# Patient Record
Sex: FEMALE | Race: WHITE | Employment: OTHER | URBAN - METROPOLITAN AREA
[De-identification: names, ages, dates, MRNs, and addresses within clinical notes are randomized per-mention and may not be internally consistent; named-entity substitution may affect disease eponyms.]

---

## 2017-03-02 ENCOUNTER — OFFICE VISIT (OUTPATIENT)
Dept: INTERNAL MEDICINE CLINIC | Age: 67
End: 2017-03-02

## 2017-03-02 VITALS
TEMPERATURE: 97.4 F | DIASTOLIC BLOOD PRESSURE: 71 MMHG | RESPIRATION RATE: 16 BRPM | WEIGHT: 127 LBS | SYSTOLIC BLOOD PRESSURE: 124 MMHG | BODY MASS INDEX: 21.68 KG/M2 | HEART RATE: 84 BPM | OXYGEN SATURATION: 97 % | HEIGHT: 64 IN

## 2017-03-02 DIAGNOSIS — H26.9 CATARACT: Primary | ICD-10-CM

## 2017-03-02 DIAGNOSIS — Z01.818 PRE-OP EXAM: ICD-10-CM

## 2017-03-02 DIAGNOSIS — J30.9 ALLERGIC RHINITIS, UNSPECIFIED ALLERGIC RHINITIS TRIGGER, UNSPECIFIED RHINITIS SEASONALITY: ICD-10-CM

## 2017-03-02 RX ORDER — KETOROLAC TROMETHAMINE 4 MG/ML
SOLUTION/ DROPS OPHTHALMIC
COMMUNITY
Start: 2017-02-27 | End: 2018-03-02 | Stop reason: ALTCHOICE

## 2017-03-02 RX ORDER — PREDNISOLONE ACETATE 10 MG/ML
SUSPENSION/ DROPS OPHTHALMIC
COMMUNITY
Start: 2017-02-27 | End: 2018-03-02 | Stop reason: ALTCHOICE

## 2017-03-02 RX ORDER — OFLOXACIN 3 MG/ML
SOLUTION/ DROPS OPHTHALMIC
COMMUNITY
Start: 2017-02-27 | End: 2018-03-02 | Stop reason: ALTCHOICE

## 2017-03-02 NOTE — PROGRESS NOTES
HISTORY OF PRESENT ILLNESS  Michelle Hand is a 77 y.o. female. This is a patient of Dr. Courtney Vargas who presents today for pre-op exam.  The patient is scheduled to have cataract extraction by Dr. Juwan Bashir. She will have right eye surgery on March 14, 2017 and left eye on March 28,2017. The patient states she is generally feeling well at the time of today's visit. She has noted several weeks of intermittent nasal congestion, left ear congestion, and post-nasal drip. No fevers, chills, chest pain or shortness of breath. Visit Vitals    /71    Pulse 84    Temp 97.4 °F (36.3 °C) (Oral)    Resp 16    Ht 5' 4\" (1.626 m)    Wt 127 lb (57.6 kg)    SpO2 97%    BMI 21.8 kg/m2     HPI    Review of Systems   Constitutional: Negative for chills, fever, malaise/fatigue and weight loss. HENT: Positive for congestion. Negative for ear pain and sore throat. Eyes: Negative for pain. Respiratory: Negative for cough, shortness of breath and wheezing. Cardiovascular: Negative for chest pain, palpitations and leg swelling. Gastrointestinal: Negative for abdominal pain. Genitourinary: Negative. Musculoskeletal: Negative. Skin: Negative. Neurological: Negative for dizziness, tingling, tremors, weakness and headaches. Endo/Heme/Allergies: Negative. Psychiatric/Behavioral: Negative. Physical Exam   Constitutional: She is oriented to person, place, and time. She appears well-developed and well-nourished. No distress. HENT:   Head: Normocephalic and atraumatic. Right Ear: External ear normal.   Left Ear: External ear normal.   Op with cobblestoning   Eyes: Conjunctivae are normal.   Neck: Neck supple. Cardiovascular: Normal rate, regular rhythm, normal heart sounds and intact distal pulses. No murmur heard. Pulmonary/Chest: Effort normal and breath sounds normal. No respiratory distress. She has no wheezes. She has no rales. Abdominal: Soft.  Bowel sounds are normal. She exhibits no distension. There is no tenderness. Musculoskeletal: She exhibits no edema. Lymphadenopathy:     She has no cervical adenopathy. Neurological: She is alert and oriented to person, place, and time. Skin: Skin is warm and dry. Psychiatric: She has a normal mood and affect. Her behavior is normal.   Nursing note and vitals reviewed. ASSESSMENT and PLAN    ICD-10-CM ICD-9-CM    1. Cataract H26.9 366.9 Completed H&P form. Continue to follow with Dr. Jeanna Cain   2. Pre-op exam Z01.818 V72.84 As above   3. Allergic rhinitis, unspecified allergic rhinitis trigger, unspecified rhinitis seasonality J30.9 477.9 May take OTC Claritin as per packaging instructions and Flonase as per packaging instructions. Patient encouraged to call or return to office if symptoms do not improve or worsen. Reviewed medications and side effects in detail    Patient encouraged to call or return to office if symptoms do not improve or worsen. Reviewed plan of care with patient who acknowledges understanding and agrees.

## 2017-03-02 NOTE — MR AVS SNAPSHOT
Visit Information Date & Time Provider Department Dept. Phone Encounter #  
 3/2/2017  2:30 PM Anjelica Everett, [address] [facility] Internal Medicine [phone] [id number] Upcoming Health Maintenance Date Due Hepatitis C Screening 1950 DTaP/Tdap/Td series (1 - Tdap) 3/30/1971 FOBT Q 1 YEAR AGE 50-75 3/30/2000 ZOSTER VACCINE AGE 60> 3/30/2010 GLAUCOMA SCREENING Q2Y 3/30/2015 MEDICARE YEARLY EXAM 3/30/2015 BREAST CANCER SCRN MAMMOGRAM 9/27/2015 Pneumococcal 65+ Low/Medium Risk (2 of 2 - PPSV23) 2/14/2016 Allergies as of 3/2/2017  Review Complete On: 3/2/2017 By: Anjelica Everett NP No Known Allergies Current Immunizations  Reviewed on 11/2/2016 Name Date Influenza High Dose Vaccine PF 11/2/2016 Pneumococcal Vaccine (Unspecified Type) 2/14/2011 Not reviewed this visit You Were Diagnosed With   
  
 Codes Comments Cataract    -  Primary ICD-10-CM: H26.9 ICD-9-CM: 366.9 Pre-op exam     ICD-10-CM: S75.462 ICD-9-CM: V72.84 Vitals BP  
  
  
  
  
  
 124/71 Vitals History BMI and BSA Data Body Mass Index Body Surface Area  
 21.8 kg/m 2 1.61 m 2 Preferred Pharmacy Pharmacy Name Phone [facility] [address] [facility] [phone] Your Updated Medication List  
  
   
This list is accurate as of: 3/2/17  3:16 PM.  Always use your most recent med list.  
  
  
  
  
 Citracal + D tablet Generic drug:  calcium citrate-vitamin D3 Take  by mouth every thirty (30) days. COQ-10 PO Take  by mouth. Flaxseed Oil Oil  
by Does Not Apply route.  
  
 ketorolac 0.4 % Drop Commonly known as:  ACULAR LS  
  
 M-VIT PO Take  by mouth. ofloxacin 0.3 % ophthalmic solution Commonly known as:  FLOXIN  
  
 PARLODEL 2.5 mg tablet Generic drug:  bromocriptine Take 2.5 mg by mouth daily. prednisoLONE acetate 1 % ophthalmic suspension Commonly known as:  PRED FORTE TURMERIC ROOT EXTRACT PO Take  by mouth. Introducing [facility] [place] [facility] SERVICES! Dear Christy Aaron: Thank you for requesting a [employer] account. Our records indicate that you already have an active [employer] account. You can access your account anytime at [email or web address] Did you know that you can access your hospital and ER discharge instructions at any time in [employer]? You can also review all of your test results from your hospital stay or ER visit. Additional Information If you have questions, please visit the Frequently Asked Questions section of the [employer] website at [email or web address] Remember, [employer] is NOT to be used for urgent needs. For medical emergencies, dial 911. Now available from your iPhone and Android! Please provide this summary of care documentation to your next provider. Your primary care clinician is listed as Cony Pedro. If you have any questions after today's visit, please call [phone].

## 2017-03-02 NOTE — PROGRESS NOTES
Chief Complaint   Patient presents with    Pre-op Exam     right eye cataract - Dr. Franny Iraheta     1. Have you been to the ER, urgent care clinic since your last visit? Hospitalized since your last visit? No    2. Have you seen or consulted any other health care providers outside of the [address] [place] [address] since your last visit? Include any pap smears or colon screening.    Ashkan Molina and also Eye Dr. Connie Lazar.      Used 2 patient I. D. 's

## 2017-07-12 ENCOUNTER — HOSPITAL ENCOUNTER (OUTPATIENT)
Dept: MAMMOGRAPHY | Age: 67
Discharge: HOME | End: 2017-07-12
Attending: INTERNAL MEDICINE
Payer: COMMERCIAL

## 2017-07-12 DIAGNOSIS — Z12.31 VISIT FOR SCREENING MAMMOGRAM: ICD-10-CM

## 2017-07-12 PROCEDURE — 77067 SCR MAMMO BI INCL CAD: CPT

## 2017-12-04 ENCOUNTER — OFFICE VISIT (OUTPATIENT)
Dept: INTERNAL MEDICINE CLINIC | Age: 67
End: 2017-12-04

## 2017-12-04 VITALS
HEART RATE: 90 BPM | BODY MASS INDEX: 21.68 KG/M2 | RESPIRATION RATE: 18 BRPM | WEIGHT: 127 LBS | HEIGHT: 64 IN | DIASTOLIC BLOOD PRESSURE: 69 MMHG | OXYGEN SATURATION: 98 % | SYSTOLIC BLOOD PRESSURE: 126 MMHG

## 2017-12-04 DIAGNOSIS — D35.2 PITUITARY ADENOMA (HCC): ICD-10-CM

## 2017-12-04 DIAGNOSIS — M85.80 OSTEOPENIA, UNSPECIFIED LOCATION: ICD-10-CM

## 2017-12-04 DIAGNOSIS — M48.02 CERVICAL SPINAL STENOSIS: ICD-10-CM

## 2017-12-04 DIAGNOSIS — J06.9 VIRAL URI WITH COUGH: Primary | ICD-10-CM

## 2017-12-04 DIAGNOSIS — K57.90 DIVERTICULOSIS OF INTESTINE WITHOUT BLEEDING, UNSPECIFIED INTESTINAL TRACT LOCATION: ICD-10-CM

## 2017-12-04 NOTE — PROGRESS NOTES
Health Maintenance Due   Topic Date Due    Hepatitis C Screening  1950    DTaP/Tdap/Td series (1 - Tdap) 03/30/1971    FOBT Q 1 YEAR AGE 50-75  03/30/2000    ZOSTER VACCINE AGE 60>  01/30/2010    MEDICARE YEARLY EXAM  03/30/2015    Pneumococcal 65+ Low/Medium Risk (2 of 2 - PPSV23) 02/14/2016    Influenza Age 5 to Adult  08/01/2017       Chief Complaint   Patient presents with    Cold Symptoms     mostly at night    Ear Fullness    Osteopenia       1. Have you been to the ER, urgent care clinic since your last visit? Hospitalized since your last visit? No    2. Have you seen or consulted any other health care providers outside of the [address] [place] [address] since your last visit? Include any pap smears or colon screening. No    3) Do you have an Advance Directive on file? no    4) Are you interested in receiving information on Advance Directives? NO      Patient is accompanied by self I have received verbal consent from Luis Antonio Guthrie to discuss any/all medical information while they are present in the room.

## 2017-12-04 NOTE — MR AVS SNAPSHOT
Visit Information Date & Time Provider Department Dept. Phone Encounter #  
 12/4/2017 10:30 AM Shira Postal, [address] [facility] Internal Medicine [phone] [id number] Follow-up Instructions Return in about 1 year (around 12/4/2018). Upcoming Health Maintenance Date Due Hepatitis C Screening 1950 DTaP/Tdap/Td series (1 - Tdap) 3/30/1971 FOBT Q 1 YEAR AGE 50-75 3/30/2000 ZOSTER VACCINE AGE 60> 1/30/2010 MEDICARE YEARLY EXAM 3/30/2015 Pneumococcal 65+ Low/Medium Risk (2 of 2 - PPSV23) 2/14/2016 Influenza Age 5 to Adult 8/1/2017 GLAUCOMA SCREENING Q2Y 2/27/2019 BREAST CANCER SCRN MAMMOGRAM 7/12/2019 Allergies as of 12/4/2017  Review Complete On: 12/4/2017 By: Shira Hill, DO No Known Allergies Current Immunizations  Reviewed on 11/2/2016 Name Date Influenza High Dose Vaccine PF 11/2/2016 ZZZ-RETIRED (DO NOT USE) Pneumococcal Vaccine (Unspecified Type) 2/14/2011 Not reviewed this visit You Were Diagnosed With   
  
 Codes Comments Viral URI with cough    -  Primary ICD-10-CM: J06.9, B97.89 ICD-9-CM: 465.9 Osteopenia, unspecified location     ICD-10-CM: M85.80 ICD-9-CM: 733.90 Pituitary adenoma ([facility] [address]     ICD-10-CM: D35.2 ICD-9-CM: 227.3 Cervical spinal stenosis     ICD-10-CM: M48.02 
ICD-9-CM: 723.0 Diverticulosis of intestine without bleeding, unspecified intestinal tract location     ICD-10-CM: K57.90 ICD-9-CM: 562.10 Vitals BP Pulse Resp Height(growth percentile) Weight(growth percentile) SpO2  
 126/69 (BP 1 Location: Right arm, BP Patient Position: Sitting) 90 18 5' 4\" (1.626 m) 127 lb (57.6 kg) 98% BMI OB Status Smoking Status 21.8 kg/m2 Postmenopausal Never Smoker Vitals History BMI and BSA Data Body Mass Index Body Surface Area  
 21.8 kg/m 2 1.61 m 2 Preferred Pharmacy Pharmacy Name Phone [facility] [address] [facility] [address] [facility] [phone] Your Updated Medication List  
  
   
This list is accurate as of: 12/4/17 11:04 AM.  Always use your most recent med list.  
  
  
  
  
 Citracal + D tablet Generic drug:  calcium citrate-vitamin D3 Take  by mouth every thirty (30) days. COQ-10 PO Take  by mouth. Flaxseed Oil Oil  
by Does Not Apply route.  
  
 ketorolac 0.4 % Drop Commonly known as:  ACULAR LS  
  
 M-VIT PO Take  by mouth. ofloxacin 0.3 % ophthalmic solution Commonly known as:  FLOXIN  
  
 PARLODEL 2.5 mg tablet Generic drug:  bromocriptine Take 2.5 mg by mouth daily. prednisoLONE acetate 1 % ophthalmic suspension Commonly known as:  PRED FORTE TURMERIC ROOT EXTRACT PO Take  by mouth. Follow-up Instructions Return in about 1 year (around 12/4/2018). Introducing [facility] [place] [facility] SERVICES! Dear Gioia Leventhal: Thank you for requesting a [employer] account. Our records indicate that you already have an active [employer] account. You can access your account anytime at [email or web address] Did you know that you can access your hospital and ER discharge instructions at any time in [employer]? You can also review all of your test results from your hospital stay or ER visit. Additional Information If you have questions, please visit the Frequently Asked Questions section of the [employer] website at [email or web address] Remember, [employer] is NOT to be used for urgent needs. For medical emergencies, dial 911. Now available from your iPhone and Android! Please provide this summary of care documentation to your next provider. Your primary care clinician is listed as Malini Sanford. If you have any questions after today's visit, please call [phone].

## 2017-12-04 NOTE — PROGRESS NOTES
HISTORY OF PRESENT ILLNESS  Garrison Schultz is a 79 y.o. female. She is back for follow-up. Reports a week of cold symptoms with cough and occasional yellow sputum. No fevers, chest pain, dyspnea. Overall is feeling better. Her  has similar problems. Followed by endocrinologist for pituitary adenoma. Remains on medications. Her other chronic medical issues have been stable. Colonoscopy is up-to-date. Reports having labs through work and everything has been stable. Cholesterol was high in the past but HDL is high as well. Denies smoking. Drinks alcohol socially. No other new complaints. Cold Symptoms   Associated symptoms include ear pain. Pertinent negatives include no chest pain, no headaches, no shortness of breath and no wheezing. Cough   Pertinent negatives include no chest pain, no abdominal pain, no headaches and no shortness of breath. Review of Systems   Constitutional: Negative. HENT: Positive for congestion, ear pain and hearing loss. Negative for ear discharge and tinnitus. Eyes: Negative for blurred vision. Respiratory: Positive for cough. Negative for shortness of breath and wheezing. Cardiovascular: Negative for chest pain and leg swelling. Gastrointestinal: Negative for abdominal pain and heartburn. Genitourinary: Negative for dysuria. Musculoskeletal: Positive for joint pain. Negative for back pain and falls. Skin: Negative. Neurological: Negative for dizziness, sensory change, focal weakness and headaches. Psychiatric/Behavioral: Negative for depression. The patient is not nervous/anxious and does not have insomnia. All other systems reviewed and are negative. Physical Exam   Constitutional: She is oriented to person, place, and time. She appears well-developed and well-nourished. No distress. HENT:   Head: Normocephalic and atraumatic.    Right Ear: External ear normal.   Left Ear: External ear normal.   Nose: Nose normal.   Mouth/Throat: Oropharynx is clear and moist. No oropharyngeal exudate. Eyes: Conjunctivae are normal. No scleral icterus. Neck: Normal range of motion. Neck supple. No JVD present. No thyromegaly present. Cardiovascular: Normal rate, regular rhythm, normal heart sounds and intact distal pulses. No murmur heard. Pulmonary/Chest: Effort normal and breath sounds normal. No respiratory distress. She has no wheezes. She has no rales. Abdominal: Soft. Bowel sounds are normal. She exhibits no distension. There is no tenderness. Musculoskeletal: She exhibits no edema or tenderness. djd   Neurological: She is alert and oriented to person, place, and time. Coordination normal.   Skin: Skin is warm and dry. No rash noted. No erythema. Psychiatric: She has a normal mood and affect. Her behavior is normal.   Nursing note and vitals reviewed. ASSESSMENT and PLAN  Diagnoses and all orders for this visit:    1. Viral URI with cough    2. Osteopenia, unspecified location    3. Pituitary adenoma ([address]    4. Cervical spinal stenosis    5. Diverticulosis of intestine without bleeding, unspecified intestinal tract location      Follow-up Disposition:  Return in about 1 year (around 12/4/2018).    lab results and schedule of future lab studies reviewed with patient  reviewed diet, exercise and weight control  reviewed medications and side effects in detail    Mucinex DM 1 twice daily as needed  Increase fluid intake  Tylenol as needed  Reassurance  Overall stable

## 2018-03-02 ENCOUNTER — OFFICE VISIT (OUTPATIENT)
Dept: INTERNAL MEDICINE CLINIC | Age: 68
End: 2018-03-02

## 2018-03-02 VITALS
HEART RATE: 78 BPM | WEIGHT: 131 LBS | BODY MASS INDEX: 22.36 KG/M2 | HEIGHT: 64 IN | DIASTOLIC BLOOD PRESSURE: 58 MMHG | RESPIRATION RATE: 19 BRPM | OXYGEN SATURATION: 98 % | SYSTOLIC BLOOD PRESSURE: 117 MMHG

## 2018-03-02 DIAGNOSIS — D35.2 PITUITARY ADENOMA (HCC): ICD-10-CM

## 2018-03-02 DIAGNOSIS — Z23 NEED FOR ZOSTER VACCINATION: ICD-10-CM

## 2018-03-02 DIAGNOSIS — M19.042 PRIMARY OSTEOARTHRITIS OF BOTH HANDS: Primary | ICD-10-CM

## 2018-03-02 DIAGNOSIS — M19.041 PRIMARY OSTEOARTHRITIS OF BOTH HANDS: Primary | ICD-10-CM

## 2018-03-02 DIAGNOSIS — E55.9 VITAMIN D DEFICIENCY: ICD-10-CM

## 2018-03-02 DIAGNOSIS — M48.02 CERVICAL SPINAL STENOSIS: ICD-10-CM

## 2018-03-02 NOTE — PROGRESS NOTES
Health Maintenance Due   Topic Date Due    Hepatitis C Screening  1950    DTaP/Tdap/Td series (1 - Tdap) 03/30/1971    FOBT Q 1 YEAR AGE 50-75  03/30/2000    ZOSTER VACCINE AGE 60>  01/30/2010    MEDICARE YEARLY EXAM  03/30/2015    Pneumococcal 65+ Low/Medium Risk (2 of 2 - PPSV23) 02/14/2016       Chief Complaint   Patient presents with    Finger Pain     thumb, middle    Back Pain     upper back    Immunization/Injection     zoster       1. Have you been to the ER, urgent care clinic since your last visit? Hospitalized since your last visit? No    2. Have you seen or consulted any other health care providers outside of the [address] [place] [address] since your last visit? Include any pap smears or colon screening. No    3) Do you have an Advance Directive on file? no    4) Are you interested in receiving information on Advance Directives? NO      Patient is accompanied by self I have received verbal consent from Chantal Cassette to discuss any/all medical information while they are present in the room.

## 2018-03-02 NOTE — MR AVS SNAPSHOT
[address] [facility] [address] [place] [address] 
[phone] Patient: Citlalli Cadet MRN:  [id number] Visit Information Date & Time Provider Department Dept. Phone Encounter #  
 3/2/2018  3:00 PM Dulce Morris, Cor[facility] Internal Medicine [phone] [id number] Follow-up Instructions Return in about 1 year (around 3/2/2019). Upcoming Health Maintenance Date Due Hepatitis C Screening 1950 DTaP/Tdap/Td series (1 - Tdap) 3/30/1971 FOBT Q 1 YEAR AGE 50-75 3/30/2000 ZOSTER VACCINE AGE 60> 1/30/2010 Pneumococcal 65+ Low/Medium Risk (2 of 2 - PPSV23) 2/14/2016 GLAUCOMA SCREENING Q2Y 2/27/2019 MEDICARE YEARLY EXAM 3/3/2019 BREAST CANCER SCRN MAMMOGRAM 7/12/2019 Allergies as of 3/2/2018  Review Complete On: 3/2/2018 By: Dulce Morris DO No Known Allergies Current Immunizations  Reviewed on 3/2/2018 Name Date Influenza High Dose Vaccine PF 11/2/2016 ZZZ-RETIRED (DO NOT USE) Pneumococcal Vaccine (Unspecified Type) 2/14/2011 Reviewed by Dulce Morris DO on 3/2/2018 at  3:08 PM  
You Were Diagnosed With   
  
 Codes Comments Primary osteoarthritis of both hands    -  Primary ICD-10-CM: M19.041, W19.635 ICD-9-CM: 715.14 [facility]     ICD-10-CM: D35.2 ICD-9-CM: 227.3 Cervical spinal stenosis     ICD-10-CM: M48.02 
ICD-9-CM: 723.0 Vitamin D deficiency     ICD-10-CM: E55.9 ICD-9-CM: 268.9 Need for zoster vaccination     ICD-10-CM: I72 ICD-9-CM: V04.89 Vitals BP Pulse Resp Height(growth percentile) Weight(growth percentile) SpO2  
 117/58 (BP 1 Location: Right arm, BP Patient Position: Sitting) 78 19 5' 4\" (1.626 m) 131 lb (59.4 kg) 98% BMI OB Status Smoking Status 22.49 kg/m2 Postmenopausal Never Smoker Vitals History BMI and BSA Data  Body Mass Index Body Surface Area  
 22.49 kg/m 2 1.64 m 2  
  
  
 Preferred Pharmacy Pharmacy Name Phone [facility] [address] [facility] [address] [facility] [phone] Your Updated Medication List  
  
   
This list is accurate as of 3/2/18  3:14 PM.  Always use your most recent med list.  
  
  
  
  
 Citracal + D tablet Generic drug:  calcium citrate-vitamin D3 Take  by mouth every thirty (30) days. COQ-10 PO Take  by mouth. Flaxseed Oil Oil  
by Does Not Apply route. M-VIT PO Take  by mouth. PARLODEL 2.5 mg tablet Generic drug:  bromocriptine Take 2.5 mg by mouth daily. TURMERIC ROOT EXTRACT PO Take  by mouth. Follow-up Instructions Return in about 1 year (around 3/2/2019). Introducing \[facility]\"" & [facility] [place] SERVICES! Dear Trang Amaya: Thank you for requesting a [employer] account. Our records indicate that you already have an active [employer] account. You can access your account anytime at [email or web address] Did you know that you can access your hospital and ER discharge instructions at any time in [employer]? You can also review all of your test results from your hospital stay or ER visit. Additional Information If you have questions, please visit the Frequently Asked Questions section of the [employer] website at [email or web address] Remember, [employer] is NOT to be used for urgent needs. For medical emergencies, dial 911. Now available from your iPhone and Android! Please provide this summary of care documentation to your next provider. Your primary care clinician is listed as Norbert Reinoso. If you have any questions after today's visit, please call [phone].

## 2018-03-30 NOTE — PROGRESS NOTES
HISTORY OF PRESENT ILLNESS  Gemma Ochoa is a 76 y.o. female. She is here for follow-up. Reports having pain in her fingers. No trauma. Some stiffness in the morning. Has arthritic pain in the neck with spinal stenosis. Followed by endocrinologist for pituitary adenoma. Remains on bromocriptine. Denies any related symptoms. Asking for shingles vaccination. No other significant medical issues or problems. Denies smoking. Drinks socially. Finger Pain   Pertinent negatives include no chest pain, no abdominal pain, no headaches and no shortness of breath. Back Pain    Pertinent negatives include no chest pain, no headaches, no abdominal pain and no dysuria. Immunization/Injection   Pertinent negatives include no chest pain, no abdominal pain, no headaches and no shortness of breath. Review of Systems   Constitutional: Negative. HENT: Positive for hearing loss. Eyes: Negative for blurred vision. Respiratory: Negative for cough and shortness of breath. Cardiovascular: Negative for chest pain and leg swelling. Gastrointestinal: Negative for abdominal pain and heartburn. Genitourinary: Negative for dysuria and frequency. Musculoskeletal: Positive for joint pain and neck pain. Negative for falls. Skin: Negative for rash. Neurological: Negative for dizziness, sensory change, focal weakness and headaches. Psychiatric/Behavioral: Negative for depression. The patient is not nervous/anxious and does not have insomnia. All other systems reviewed and are negative. Physical Exam   Constitutional: She is oriented to person, place, and time. She appears well-developed and well-nourished. No distress. HENT:   Head: Normocephalic and atraumatic. Mouth/Throat: Oropharynx is clear and moist.   Eyes: Conjunctivae are normal. Pupils are equal, round, and reactive to light. No scleral icterus. Neck: Normal range of motion. Neck supple. No JVD present. No thyromegaly present. Cardiovascular: Normal rate, regular rhythm, normal heart sounds and intact distal pulses. No murmur heard. Pulmonary/Chest: Effort normal and breath sounds normal. No respiratory distress. She has no wheezes. She has no rales. Abdominal: Soft. Bowel sounds are normal. She exhibits no distension. There is no tenderness. Musculoskeletal: She exhibits tenderness (cervicals). She exhibits no edema. djd  Bilateral hand arthritis   Neurological: She is alert and oriented to person, place, and time. Coordination normal.   Skin: Skin is warm and dry. No rash noted. Psychiatric: She has a normal mood and affect. Her behavior is normal.   Nursing note and vitals reviewed. ASSESSMENT and PLAN  Diagnoses and all orders for this visit:    1. Primary osteoarthritis of both hands    2. Pituitary adenoma ([address]    3. Cervical spinal stenosis    4. Vitamin D deficiency    5. Need for zoster vaccination      Follow-up Disposition:  Return in about 1 year (around 3/2/2019).    lab results and schedule of future lab studies reviewed with patient  reviewed diet, exercise and weight control  reviewed medications and side effects in detail  Discussed etiology and treatment of osteoarthritis with patient  Tylenol or OTC NSAIDs as needed pain  F/u with other MD's as scheduled  Overall stable

## 2018-05-16 ENCOUNTER — HOSPITAL ENCOUNTER (OUTPATIENT)
Dept: GENERAL RADIOLOGY | Age: 68
Discharge: HOME | End: 2018-05-16
Payer: COMMERCIAL

## 2018-05-16 ENCOUNTER — OFFICE VISIT (OUTPATIENT)
Dept: INTERNAL MEDICINE CLINIC | Age: 68
End: 2018-05-16

## 2018-05-16 VITALS
HEART RATE: 74 BPM | WEIGHT: 132 LBS | BODY MASS INDEX: 22.53 KG/M2 | RESPIRATION RATE: 20 BRPM | TEMPERATURE: 98 F | SYSTOLIC BLOOD PRESSURE: 118 MMHG | HEIGHT: 64 IN | OXYGEN SATURATION: 97 % | DIASTOLIC BLOOD PRESSURE: 61 MMHG

## 2018-05-16 DIAGNOSIS — M79.672 LEFT FOOT PAIN: ICD-10-CM

## 2018-05-16 DIAGNOSIS — S99.922A INJURY OF LEFT FOOT, INITIAL ENCOUNTER: ICD-10-CM

## 2018-05-16 DIAGNOSIS — S99.922A INJURY OF LEFT FOOT, INITIAL ENCOUNTER: Primary | ICD-10-CM

## 2018-05-16 PROCEDURE — 73630 X-RAY EXAM OF FOOT: CPT

## 2018-05-16 NOTE — PROGRESS NOTES
Has moderate DJD in first metatarsophalangeal joint along with cartilage loss patient also have mild osteopenia and chronic flattening of the second metatarsal head.   It for patient to Abilio Carlson, podiatrist.

## 2018-05-16 NOTE — PROGRESS NOTES
Health Maintenance Due   Topic Date Due    Hepatitis C Screening  1950    DTaP/Tdap/Td series (1 - Tdap) 03/30/1971    FOBT Q 1 YEAR AGE 50-75  03/30/2000    ZOSTER VACCINE AGE 60>  01/30/2010    Pneumococcal 65+ Low/Medium Risk (2 of 2 - PPSV23) 02/14/2016       Chief Complaint   Patient presents with    Foot Pain     left foot pain, stepped down hard on her foot, no bruising or discoloration       1. Have you been to the ER, urgent care clinic since your last visit? Hospitalized since your last visit? No    2. Have you seen or consulted any other health care providers outside of the [facility] since your last visit? Include any pap smears or colon screening. No    3) Do you have an Advance Directive on file? no    4) Are you interested in receiving information on Advance Directives? NO      Patient is accompanied by self I have received verbal consent from Corey Calix to discuss any/all medical information while they are present in the room.

## 2018-05-16 NOTE — PROGRESS NOTES
HISTORY OF PRESENT ILLNESS  Kathi Andrews is a 76 y.o. female here with the complaining of left foot pain. She is stable on the leash of her dog, and almost slipped report pain on left. Heel and sometimes radiated to her arch and in the middle of the foot. Pain gets worse when he stands on her feet. Able to walk. No other discomfort. No weightbearing balance issue. Has pituitary tumor, on bromocriptine, seeing endocrinologist.  Hola Moron done once a year are stable. HPI    Review of Systems   Constitutional: Negative. HENT: Negative. Eyes: Negative. Respiratory: Negative. Cardiovascular: Negative. Gastrointestinal: Negative. Genitourinary: Negative. Musculoskeletal: Negative. Skin: Negative. Neurological: Negative. Psychiatric/Behavioral: Negative. Physical Exam   Constitutional: She appears well-developed and well-nourished. No distress. Neck: Normal range of motion. Neck supple. No JVD present. No thyromegaly present. Cardiovascular: Normal rate, regular rhythm, normal heart sounds and intact distal pulses. Pulmonary/Chest: Effort normal and breath sounds normal. No respiratory distress. She has no wheezes. Musculoskeletal: She exhibits tenderness. She exhibits no edema. Left foot: Mild tenderness on heel. Range of motion okay. No swelling of foot. Psychiatric: She has a normal mood and affect. Her behavior is normal.       ASSESSMENT and PLAN  Diagnoses and all orders for this visit:    1. Injury of left foot, initial encounter    Rest and ice. Will order,  -     XR FOOT LT MIN 3 V; Future    2. Left foot pain    Advised her to use Advil or Aleve twice a day as needed. Will check,  -     XR FOOT LT MIN 3 V; Future    Discussed expected course/resolution/complications of diagnosis in detail with patient. Medication risks/benefits/costs/interactions/alternatives discussed with patient.    Pt was given an after visit summary which includes diagnoses, current medications & vitals. Pt expressed understanding with the diagnosis and plan.

## 2018-05-16 NOTE — MR AVS SNAPSHOT
[address] [facility] 102 1400 [address] [place] [address] 
[phone] Patient: Corey Calix MRN:  [id number] Visit Information Date & Time Provider Department Dept. Phone Encounter #  
 5/16/2018  1:30 PM Barbra Voss [facility] Internal Medicine [phone] [id number] Upcoming Health Maintenance Date Due Hepatitis C Screening 1950 DTaP/Tdap/Td series (1 - Tdap) 3/30/1971 FOBT Q 1 YEAR AGE 50-75 3/30/2000 ZOSTER VACCINE AGE 60> 1/30/2010 Pneumococcal 65+ Low/Medium Risk (2 of 2 - PPSV23) 2/14/2016 Influenza Age 5 to Adult 8/1/2018 GLAUCOMA SCREENING Q2Y 2/27/2019 BREAST CANCER SCRN MAMMOGRAM 7/12/2019 Allergies as of 5/16/2018  Review Complete On: 5/16/2018 By: Jeremi Colon MD  
 No Known Allergies Current Immunizations  Reviewed on 3/2/2018 Name Date Influenza High Dose Vaccine PF 11/2/2016 ZZZ-RETIRED (DO NOT USE) Pneumococcal Vaccine (Unspecified Type) 2/14/2011 Not reviewed this visit You Were Diagnosed With   
  
 Codes Comments Injury of left foot, initial encounter    -  Primary ICD-10-CM: M31.498E ICD-9-CM: 959.7 Left foot pain     ICD-10-CM: S69.506 ICD-9-CM: 729.5 Vitals BP Pulse Temp Resp Height(growth percentile) Weight(growth percentile)  
 118/61 (BP 1 Location: Left arm, BP Patient Position: Sitting) 74 98 °F (36.7 °C) (Oral) 20 5' 4\" (1.626 m) 132 lb (59.9 kg) SpO2 BMI OB Status Smoking Status 97% 22.66 kg/m2 Postmenopausal Never Smoker Vitals History BMI and BSA Data Body Mass Index Body Surface Area  
 22.66 kg/m 2 1.64 m 2 Preferred Pharmacy Pharmacy Name Phone [facility] [address] [place] [address] [address] Nanette Barry [facility] [phone] Your Updated Medication List  
  
   
This list is accurate as of 5/16/18  2:21 PM.  Always use your most recent med list.  
  
  
  
  
 Citracal + D tablet Generic drug:  calcium citrate-vitamin D3 Take  by mouth every thirty (30) days. COQ-10 PO Take  by mouth. Flaxseed Oil Oil  
by Does Not Apply route. M-VIT PO Take  by mouth. PARLODEL 2.5 mg tablet Generic drug:  bromocriptine Take 2.5 mg by mouth daily. TURMERIC ROOT EXTRACT PO Take  by mouth. To-Do List   
 05/17/2018 Imaging:  XR FOOT LT MIN 3 V Introducing [facility]! Dear Ezio Freeze: Thank you for requesting a [employer] account. Our records indicate that you already have an active [employer] account. You can access your account anytime at [email or web address] Did you know that you can access your hospital and ER discharge instructions at any time in [employer]? You can also review all of your test results from your hospital stay or ER visit. Additional Information If you have questions, please visit the Frequently Asked Questions section of the [employer] website at [email or web address] Remember, [employer] is NOT to be used for urgent needs. For medical emergencies, dial 911. Now available from your iPhone and Android! Please provide this summary of care documentation to your next provider. Your primary care clinician is listed as Breanna Beatty. If you have any questions after today's visit, please call [phone].

## 2018-07-13 ENCOUNTER — HOSPITAL ENCOUNTER (OUTPATIENT)
Dept: MAMMOGRAPHY | Age: 68
Discharge: HOME | End: 2018-07-13
Payer: COMMERCIAL

## 2018-07-13 DIAGNOSIS — Z12.39 SCREENING BREAST EXAMINATION: ICD-10-CM

## 2018-07-13 PROCEDURE — 77067 SCR MAMMO BI INCL CAD: CPT

## 2018-10-26 ENCOUNTER — OFFICE VISIT (OUTPATIENT)
Dept: INTERNAL MEDICINE CLINIC | Age: 68
End: 2018-10-26

## 2018-10-26 VITALS
DIASTOLIC BLOOD PRESSURE: 66 MMHG | BODY MASS INDEX: 21.85 KG/M2 | WEIGHT: 128 LBS | HEART RATE: 89 BPM | RESPIRATION RATE: 20 BRPM | TEMPERATURE: 98 F | HEIGHT: 64 IN | OXYGEN SATURATION: 95 % | SYSTOLIC BLOOD PRESSURE: 121 MMHG

## 2018-10-26 DIAGNOSIS — D35.2 PITUITARY ADENOMA (HCC): ICD-10-CM

## 2018-10-26 DIAGNOSIS — M25.561 ACUTE PAIN OF RIGHT KNEE: Primary | ICD-10-CM

## 2018-10-26 RX ORDER — IBUPROFEN 200 MG
200 TABLET ORAL
Qty: 30 TAB
Start: 2018-10-26

## 2018-10-26 NOTE — PROGRESS NOTES
Health Maintenance Due   Topic Date Due    Hepatitis C Screening  1950    DTaP/Tdap/Td series (1 - Tdap) 03/30/1971    Shingrix Vaccine Age 50> (1 of 2) 03/30/2000    FOBT Q 1 YEAR AGE 50-75  03/30/2000    Pneumococcal 65+ Low/Medium Risk (2 of 2 - PPSV23) 02/14/2016    Influenza Age 5 to Adult  08/01/2018       Chief Complaint   Patient presents with    Knee Injury     right knee since last thursday       1. Have you been to the ER, urgent care clinic since your last visit? Hospitalized since your last visit? No    2. Have you seen or consulted any other health care providers outside of the [address] [place] [address] since your last visit? Include any pap smears or colon screening. No    3) Do you have an Advance Directive on file? no    4) Are you interested in receiving information on Advance Directives? NO      Patient is accompanied by self I have received verbal consent from Veronica Kohler to discuss any/all medical information while they are present in the room.

## 2018-10-31 NOTE — PROGRESS NOTES
HISTORY OF PRESENT ILLNESS  Saran Gaxiola is a 76 y.o. female. Patient reports hurting her right knee a few days ago and has had pain since. Denies any swelling. Wants to make sure there is nothing serious going on. History of pituitary adenoma. Followed by endocrinologist.  Is been taken off bromocriptine because prolactin has been stable. She is active physically. Watches her diet. Weight is controlled. On no prescription meds. Denies tobacco or alcohol use. No other new problems. HPI    Review of Systems   Constitutional: Negative. HENT: Negative. Eyes: Negative. Respiratory: Negative. Cardiovascular: Negative. Gastrointestinal: Negative. Genitourinary: Negative. Musculoskeletal: Positive for joint pain. Negative for back pain and falls. Skin: Negative. Neurological: Negative. Psychiatric/Behavioral: Negative. Physical Exam   Constitutional: She is oriented to person, place, and time. She appears well-developed and well-nourished. No distress. HENT:   Head: Normocephalic and atraumatic. Mouth/Throat: Oropharynx is clear and moist.   Eyes: Conjunctivae are normal. Pupils are equal, round, and reactive to light. Neck: Normal range of motion. Neck supple. No thyromegaly present. Cardiovascular: Normal rate, regular rhythm, normal heart sounds and intact distal pulses. No murmur heard. Pulmonary/Chest: Effort normal and breath sounds normal. No respiratory distress. Abdominal: Soft. Bowel sounds are normal. She exhibits no distension. Musculoskeletal: Normal range of motion. She exhibits tenderness (Left knee without effusion). She exhibits no edema. djd  Bilateral hand arthritis   Neurological: She is alert and oriented to person, place, and time. Coordination normal.   Skin: Skin is warm and dry. Psychiatric: She has a normal mood and affect. Her behavior is normal.   Nursing note and vitals reviewed.       ASSESSMENT and PLAN  Diagnoses and all orders for this visit:    1. Acute pain of right knee    2. Pituitary adenoma ([address]    Other orders  -     ibuprofen (MOTRIN) 200 mg tablet; Take 1 Tab by mouth every eight (8) hours as needed for Pain. -     trolamine salicylate-aloe vera 52% (ASPERCREME) topical cream; Apply  to affected area as needed for Pain.       Follow-up Disposition:  Return if symptoms worsen or fail to improve.   lab results and schedule of future lab studies reviewed with patient  reviewed diet, exercise and weight control  reviewed medications and side effects in detail  Reassurance that everything seems stable  Advised patient to apply ice to the area  Advised patient to do knee exercises

## 2019-07-30 ENCOUNTER — HOSPITAL ENCOUNTER (OUTPATIENT)
Dept: MAMMOGRAPHY | Age: 69
Discharge: HOME | End: 2019-07-30
Payer: MEDICARE

## 2019-07-30 DIAGNOSIS — Z12.39 BREAST SCREENING, UNSPECIFIED: ICD-10-CM

## 2019-07-30 PROCEDURE — 77063 BREAST TOMOSYNTHESIS BI: CPT

## 2019-08-09 ENCOUNTER — OFFICE VISIT (OUTPATIENT)
Dept: INTERNAL MEDICINE CLINIC | Age: 69
End: 2019-08-09

## 2019-08-09 ENCOUNTER — HOSPITAL ENCOUNTER (OUTPATIENT)
Dept: LAB | Age: 69
Discharge: HOME | End: 2019-08-09
Payer: MEDICARE

## 2019-08-09 ENCOUNTER — HOSPITAL ENCOUNTER (OUTPATIENT)
Dept: GENERAL RADIOLOGY | Age: 69
Discharge: HOME | End: 2019-08-09
Payer: MEDICARE

## 2019-08-09 VITALS
TEMPERATURE: 98.1 F | OXYGEN SATURATION: 98 % | DIASTOLIC BLOOD PRESSURE: 62 MMHG | HEART RATE: 80 BPM | HEIGHT: 64 IN | SYSTOLIC BLOOD PRESSURE: 118 MMHG | WEIGHT: 131.6 LBS | RESPIRATION RATE: 16 BRPM | BODY MASS INDEX: 22.47 KG/M2

## 2019-08-09 DIAGNOSIS — Z11.59 NEED FOR HEPATITIS C SCREENING TEST: ICD-10-CM

## 2019-08-09 DIAGNOSIS — M89.8X1 PAIN OF RIGHT CLAVICLE: ICD-10-CM

## 2019-08-09 DIAGNOSIS — D35.2 PITUITARY ADENOMA (HCC): ICD-10-CM

## 2019-08-09 DIAGNOSIS — E78.00 PURE HYPERCHOLESTEROLEMIA: Primary | ICD-10-CM

## 2019-08-09 DIAGNOSIS — Z00.00 MEDICARE ANNUAL WELLNESS VISIT, SUBSEQUENT: ICD-10-CM

## 2019-08-09 DIAGNOSIS — E55.9 VITAMIN D DEFICIENCY: ICD-10-CM

## 2019-08-09 PROCEDURE — 73000 X-RAY EXAM OF COLLAR BONE: CPT

## 2019-08-09 PROCEDURE — 80061 LIPID PANEL: CPT

## 2019-08-09 PROCEDURE — 85027 COMPLETE CBC AUTOMATED: CPT

## 2019-08-09 PROCEDURE — 86803 HEPATITIS C AB TEST: CPT

## 2019-08-09 PROCEDURE — 82306 VITAMIN D 25 HYDROXY: CPT

## 2019-08-09 PROCEDURE — 80053 COMPREHEN METABOLIC PANEL: CPT

## 2019-08-09 PROCEDURE — 36415 COLL VENOUS BLD VENIPUNCTURE: CPT

## 2019-08-09 NOTE — PROGRESS NOTES
Schedule of Personalized Health Plan  (Provide Copy to Patient)  The best way to stay healthy is to live a healthy lifestyle. A healthy lifestyle includes regular exercise, eating a well-balanced diet, keeping a healthy weight and not smoking. Regular physical exams and screening tests are another important way to take care of yourself. Preventive exams provided by health care providers can find health problems early when treatment works best and can keep you from getting certain diseases or illnesses. Preventive services include exams, lab tests, screenings, shots, monitoring and information to help you take care of your own health. All people over 65 should have a pneumonia shot. Pneumonia shots are usually only needed once in a lifetime unless your doctor decides differently. All people over 65 should have a yearly flu shot. People over 65 are at medium to high risk for Hepatitis B. Three shots are needed for complete protection. In addition to your physical exam, some screening tests are recommended:    Bone mass measurement (dexa scan) is recommended every two years  Diabetes Mellitus screening is recommended every year. Glaucoma is an eye disease caused by high pressure in the eye. An eye exam is recommended every year. Cardiovascular screening tests that check your cholesterol and other blood fat (lipid) levels are recommended every five years. Colorectal Cancer screening tests help to find pre-cancerous polyps (growths in the colon) so they can be removed before they turn into cancer. Tests ordered for screening depend on your personal and family history risk factors.     Screening for Breast Cancer is recommended yearly with a mammogram.    Screening for Cervical Cancer is recommended every two years (annually for certain risk factors, such as previous history of STD or abnormal PAP in past 7 years), with a Pelvic Exam with PAP    Here is a list of your current Health Maintenance items with a due date:  Health Maintenance   Topic Date Due    Hepatitis C Screening  1950    DTaP/Tdap/Td series (1 - Tdap) 03/30/1971    FOBT Q 1 YEAR AGE 50-75  03/30/2000    Pneumococcal 65+ years (1 of 2 - PCV13) 03/30/2015    GLAUCOMA SCREENING Q2Y  02/27/2019    MEDICARE YEARLY EXAM  03/03/2019    Influenza Age 9 to Adult  08/01/2019    BREAST CANCER SCRN MAMMOGRAM  07/30/2021    Bone Densitometry (Dexa) Screening  Completed    Shingrix Vaccine Age 50>  Completed

## 2019-08-09 NOTE — PROGRESS NOTES
Gina Ramires is a 71 y.o. female    Chief Complaint   Patient presents with    Cholesterol Problem    Vitamin D Deficiency    Osteopenia    Annual Wellness Visit     1. Have you been to the ER, urgent care clinic since your last visit? Hospitalized since your last visit? No     2. Have you seen or consulted any other health care providers outside of the [address] [place] [address] since your last visit? Include any pap smears or colon screening.   No      Visit Vitals  /62 (BP 1 Location: Left arm, BP Patient Position: Sitting)   Pulse 80   Temp 98.1 °F (36.7 °C) (Oral)   Resp 16   Ht 5' 4\" (1.626 m)   Wt 131 lb 9.6 oz (59.7 kg)   SpO2 98%   BMI 22.59 kg/m²

## 2019-08-09 NOTE — PROGRESS NOTES
This is the Subsequent Medicare Annual Wellness Exam, performed 12 months or more after the Initial AWV or the last Subsequent AWV    I have reviewed the patient's medical history in detail and updated the computerized patient record. History     Past Medical History:   Diagnosis Date    Basal cell carcinoma of skin 2010    Basal cell carcinoma of skin     Cervical spinal stenosis 2010    Diverticulosis of colon     Pituitary disorders     Pure hypercholesterolemia 2012    Skin cancers     melanoma    Stenosis of lumbosacral spine     Unspecified disorder of the pituitary gland and its hypothalamic control       Past Surgical History:   Procedure Laterality Date    HX GYN      D&C's    HX HEENT      neck surgery as infant    HX OTHER SURGICAL      Excision of basal cell carcinoma.  CT UNS ORAL SURG PROC BY REPORT       Current Outpatient Medications   Medication Sig Dispense Refill    TURMERIC ROOT EXTRACT PO Take  by mouth.  PV W-O FRANKIE/FERROUS FUMARATE/FA (M-VIT PO) Take  by mouth.  Flaxseed Oil Oil by Does Not Apply route.  Calcium Citrate-Vitamin D3 (CITRACAL + D) 315-250 mg-unit Tab Take  by mouth every thirty (30) days.  ibuprofen (MOTRIN) 200 mg tablet Take 1 Tab by mouth every eight (8) hours as needed for Pain. 30 Tab prn    trolamine salicylate-aloe vera 76% (ASPERCREME) topical cream Apply  to affected area as needed for Pain. 35 g prn    UBIDECARENONE (COQ-10 PO) Take  by mouth. No Known Allergies  Family History   Problem Relation Age of Onset    Hypertension Mother     Heart Disease Mother    Ralph Carry Glaucoma Mother     Coronary Artery Disease Father     Emphysema Father     Cancer Maternal Grandmother          of rectal cancer. Social History     Tobacco Use    Smoking status: Never Smoker    Smokeless tobacco: Never Used   Substance Use Topics    Alcohol use:  Yes     Alcohol/week: 4.0 standard drinks     Types: 4 Cans of beer per week     Comment: social     Patient Active Problem List   Diagnosis Code    Stenosis, spinal, lumbar M48.061    Cervical spinal stenosis M48.02    Basal cell carcinoma of skin C44.91    Osteopenia M85.80    Pure hypercholesterolemia E78.00    Hearing loss in left ear H91.92    Pituitary adenoma ([facility] [address] D35.2    Vitamin D deficiency E55.9       Depression Risk Factor Screening:     3 most recent PHQ Screens 8/9/2019   Little interest or pleasure in doing things Not at all   Feeling down, depressed, irritable, or hopeless Not at all   Total Score PHQ 2 0     Alcohol Risk Factor Screening: You do not drink alcohol or very rarely. Functional Ability and Level of Safety:   Hearing Loss  Hearing is good. Activities of Daily Living  The home contains: no safety equipment. Patient does total self care    Fall Risk  Fall Risk Assessment, last 12 mths 8/9/2019   Able to walk? Yes   Fall in past 12 months? No       Abuse Screen  Patient is not abused    Cognitive Screening   Evaluation of Cognitive Function:  Has your family/caregiver stated any concerns about your memory: no  Normal    Patient Care Team   Patient Care Team:  Freddy Dunn DO as PCP - General    Assessment/Plan   Education and counseling provided:  Are appropriate based on today's review and evaluation    Diagnoses and all orders for this visit:    1. Pure hypercholesterolemia    2. Pituitary adenoma ([facility] [address]    3. Medicare annual wellness visit, subsequent    4.  Pain of right clavicle        Health Maintenance Due   Topic Date Due    Hepatitis C Screening  1950    DTaP/Tdap/Td series (1 - Tdap) 03/30/1971    FOBT Q 1 YEAR AGE 50-75  03/30/2000    Pneumococcal 65+ years (1 of 2 - PCV13) 03/30/2015    GLAUCOMA SCREENING Q2Y  02/27/2019    MEDICARE YEARLY EXAM  03/03/2019    Influenza Age 9 to Adult  08/01/2019

## 2019-08-10 LAB
25(OH)D3+25(OH)D2 SERPL-MCNC: 25.9 NG/ML (ref 30–100)
ALBUMIN SERPL-MCNC: 4.7 G/DL (ref 3.6–4.8)
ALBUMIN/GLOB SERPL: 2.1 {RATIO} (ref 1.2–2.2)
ALP SERPL-CCNC: 58 IU/L (ref 39–117)
ALT SERPL-CCNC: 15 IU/L (ref 0–32)
AST SERPL-CCNC: 19 IU/L (ref 0–40)
BILIRUB SERPL-MCNC: 0.7 MG/DL (ref 0–1.2)
BUN SERPL-MCNC: 10 MG/DL (ref 8–27)
BUN/CREAT SERPL: 12 (ref 12–28)
CALCIUM SERPL-MCNC: 9.2 MG/DL (ref 8.7–10.3)
CHLORIDE SERPL-SCNC: 104 MMOL/L (ref 96–106)
CHOLEST SERPL-MCNC: 303 MG/DL (ref 100–199)
CO2 SERPL-SCNC: 21 MMOL/L (ref 20–29)
CREAT SERPL-MCNC: 0.82 MG/DL (ref 0.57–1)
ERYTHROCYTE [DISTWIDTH] IN BLOOD BY AUTOMATED COUNT: 12.9 % (ref 12.3–15.4)
GLOBULIN SER CALC-MCNC: 2.2 G/DL (ref 1.5–4.5)
GLUCOSE SERPL-MCNC: 92 MG/DL (ref 65–99)
HCT VFR BLD AUTO: 44.1 % (ref 34–46.6)
HCV AB S/CO SERPL IA: <0.1 S/CO RATIO (ref 0–0.9)
HDLC SERPL-MCNC: 75 MG/DL
HGB BLD-MCNC: 13.9 G/DL (ref 11.1–15.9)
INTERPRETATION, 910389: NORMAL
LDLC SERPL CALC-MCNC: 185 MG/DL (ref 0–99)
MCH RBC QN AUTO: 29.8 PG (ref 26.6–33)
MCHC RBC AUTO-ENTMCNC: 31.5 G/DL (ref 31.5–35.7)
MCV RBC AUTO: 94 FL (ref 79–97)
PLATELET # BLD AUTO: 297 X10E3/UL (ref 150–450)
POTASSIUM SERPL-SCNC: 4.4 MMOL/L (ref 3.5–5.2)
PROT SERPL-MCNC: 6.9 G/DL (ref 6–8.5)
RBC # BLD AUTO: 4.67 X10E6/UL (ref 3.77–5.28)
SODIUM SERPL-SCNC: 141 MMOL/L (ref 134–144)
TRIGL SERPL-MCNC: 215 MG/DL (ref 0–149)
VLDLC SERPL CALC-MCNC: 43 MG/DL (ref 5–40)
WBC # BLD AUTO: 5.9 X10E3/UL (ref 3.4–10.8)

## 2019-08-15 NOTE — PROGRESS NOTES
Very High cholesterol ---- watch fatty food/exercise  I recommend starting Lipitor 20 mg daily    Low vit D--- start Vit D 50k u weekly x 4 weeks then 1000 units daily    Recheck lipids,alt,ast, vit d in 6 weeks and RTC

## 2019-08-30 DIAGNOSIS — E55.9 VITAMIN D DEFICIENCY: ICD-10-CM

## 2019-08-30 DIAGNOSIS — E78.00 PURE HYPERCHOLESTEROLEMIA: Primary | ICD-10-CM

## 2019-08-30 RX ORDER — ERGOCALCIFEROL 1.25 MG/1
50000 CAPSULE ORAL
Qty: 4 CAP | Refills: 0 | Status: SHIPPED | OUTPATIENT
Start: 2019-08-30 | End: 2019-09-21

## 2019-08-30 NOTE — PROGRESS NOTES
Spoke with patient after verifying name and . Notified patient of lab results and recommendation from provider. Patient verbalized understanding and given a chance to ask questions.   Pt states she was not fasting for this lipid panel, will recheck lipid panel prior to starting medication per verbal from provider, no letter pt can see via [employer]

## 2019-09-05 ENCOUNTER — TELEPHONE (OUTPATIENT)
Dept: INTERNAL MEDICINE CLINIC | Age: 69
End: 2019-09-05

## 2019-09-05 NOTE — TELEPHONE ENCOUNTER
----- Message from Henry Givens sent at 9/4/2019  4:04 PM EDT -----  Regarding: Dr. Lubna France: [phone]  Caller: Pt  Reason for call: Requesting an order for Cholesterol bloodwork.   Callback requested: Yes  Best contact: [phone]  Additional details:

## 2019-09-06 ENCOUNTER — HOSPITAL ENCOUNTER (OUTPATIENT)
Dept: LAB | Age: 69
Discharge: HOME | End: 2019-09-06
Payer: MEDICARE

## 2019-09-06 PROCEDURE — 80061 LIPID PANEL: CPT

## 2019-09-06 PROCEDURE — 36415 COLL VENOUS BLD VENIPUNCTURE: CPT

## 2019-09-07 LAB
CHOLEST SERPL-MCNC: 260 MG/DL (ref 100–199)
HDLC SERPL-MCNC: 73 MG/DL
INTERPRETATION, 910389: NORMAL
LDLC SERPL CALC-MCNC: 174 MG/DL (ref 0–99)
TRIGL SERPL-MCNC: 63 MG/DL (ref 0–149)
VLDLC SERPL CALC-MCNC: 13 MG/DL (ref 5–40)

## 2020-03-24 PROBLEM — D35.2 PITUITARY ADENOMA (HCC): Status: RESOLVED | Noted: 2017-12-04 | Resolved: 2020-03-24

## 2020-03-30 PROBLEM — Z86.018 HISTORY OF PITUITARY ADENOMA: Status: ACTIVE | Noted: 2020-03-30

## 2020-10-13 ENCOUNTER — HOSPITAL ENCOUNTER (OUTPATIENT)
Dept: MAMMOGRAPHY | Age: 70
Discharge: HOME | End: 2020-10-13
Payer: MEDICARE

## 2020-10-13 DIAGNOSIS — Z12.31 ENCOUNTER FOR SCREENING MAMMOGRAM FOR MALIGNANT NEOPLASM OF BREAST: ICD-10-CM

## 2020-10-13 PROCEDURE — 77063 BREAST TOMOSYNTHESIS BI: CPT

## 2021-01-29 ENCOUNTER — VIRTUAL VISIT (OUTPATIENT)
Dept: INTERNAL MEDICINE CLINIC | Age: 71
End: 2021-01-29
Payer: MEDICARE

## 2021-01-29 DIAGNOSIS — G89.29 CHRONIC RIGHT SHOULDER PAIN: Primary | ICD-10-CM

## 2021-01-29 DIAGNOSIS — M25.562 CHRONIC PAIN OF LEFT KNEE: ICD-10-CM

## 2021-01-29 DIAGNOSIS — G89.29 CHRONIC PAIN OF LEFT KNEE: ICD-10-CM

## 2021-01-29 DIAGNOSIS — Z86.018 HISTORY OF PITUITARY ADENOMA: ICD-10-CM

## 2021-01-29 DIAGNOSIS — E78.00 PURE HYPERCHOLESTEROLEMIA: ICD-10-CM

## 2021-01-29 DIAGNOSIS — M25.511 CHRONIC RIGHT SHOULDER PAIN: Primary | ICD-10-CM

## 2021-01-29 DIAGNOSIS — Z00.00 MEDICARE ANNUAL WELLNESS VISIT, SUBSEQUENT: ICD-10-CM

## 2021-01-29 DIAGNOSIS — E55.9 VITAMIN D DEFICIENCY: ICD-10-CM

## 2021-01-29 PROCEDURE — G8427 DOCREV CUR MEDS BY ELIG CLIN: HCPCS | Performed by: INTERNAL MEDICINE

## 2021-01-29 PROCEDURE — G8510 SCR DEP NEG, NO PLAN REQD: HCPCS | Performed by: INTERNAL MEDICINE

## 2021-01-29 PROCEDURE — G8399 PT W/DXA RESULTS DOCUMENT: HCPCS | Performed by: INTERNAL MEDICINE

## 2021-01-29 PROCEDURE — G0439 PPPS, SUBSEQ VISIT: HCPCS | Performed by: INTERNAL MEDICINE

## 2021-01-29 PROCEDURE — 99214 OFFICE O/P EST MOD 30 MIN: CPT | Performed by: INTERNAL MEDICINE

## 2021-01-29 PROCEDURE — 3017F COLORECTAL CA SCREEN DOC REV: CPT | Performed by: INTERNAL MEDICINE

## 2021-01-29 PROCEDURE — 1101F PT FALLS ASSESS-DOCD LE1/YR: CPT | Performed by: INTERNAL MEDICINE

## 2021-01-29 PROCEDURE — G0463 HOSPITAL OUTPT CLINIC VISIT: HCPCS | Performed by: INTERNAL MEDICINE

## 2021-01-29 PROCEDURE — G8421 BMI NOT CALCULATED: HCPCS | Performed by: INTERNAL MEDICINE

## 2021-01-29 PROCEDURE — 1090F PRES/ABSN URINE INCON ASSESS: CPT | Performed by: INTERNAL MEDICINE

## 2021-01-29 PROCEDURE — G8536 NO DOC ELDER MAL SCRN: HCPCS | Performed by: INTERNAL MEDICINE

## 2021-01-29 PROCEDURE — G9899 SCRN MAM PERF RSLTS DOC: HCPCS | Performed by: INTERNAL MEDICINE

## 2021-01-29 RX ORDER — BISMUTH SUBSALICYLATE 262 MG
1 TABLET,CHEWABLE ORAL DAILY
COMMUNITY

## 2021-01-29 NOTE — PROGRESS NOTES
Stephanie Garnica is a 79 y.o. female who was seen by synchronous (real-time) audio-video technology on 1/29/2021 for Osteopenia, Complete Physical, and Knee Swelling (makes walking painful sometimes )        Assessment & Plan:   Diagnoses and all orders for this visit:    1. Chronic right shoulder pain    2. Chronic pain of left knee    3. History of pituitary adenoma    4. Vitamin D deficiency  -     VITAMIN D, 25 HYDROXY    5. Pure hypercholesterolemia  -     LIPID PANEL  -     METABOLIC PANEL, COMPREHENSIVE  -     CBC W/O DIFF    6. Medicare annual wellness visit, subsequent        I spent at least 25 minutes on this visit with this established patient. Subjective:     Patient is seen virtually for follow-up. History of pituitary adenoma, vitamin D deficiency, HLD, and hearing loss. Reports having right shoulder pain related to old injury. Also has had left knee pain. Worse with some movements. No obvious trauma. OTC meds help some. Her  has dementia. She is a primary caregiver. They live in a long-term community now. She is on a few supplements but no prescription meds. Denies tobacco or alcohol use. Watching her diet and trying to be active physically as tolerated. Most recent labs in 2019 were reviewed. Cholesterol is high but HDL is high as well. Vitamin D was low. Due for repeat labs. She has had her first Covid vaccination. No other new complaints. Plan:  Repeat labs  ROM exercises of shoulder and knee discussed  OTC NSAIDs or Tylenol as needed pain  Not interested in getting PT at this point  F/u with other MD's as scheduled  COVID-19 precautions discussed with pt  Follow-up 1 year or as needed  Prior to Admission medications    Medication Sig Start Date End Date Taking? Authorizing Provider   multivitamin (ONE A DAY) tablet Take 1 Tab by mouth daily. Yes Provider, Historical   ibuprofen (MOTRIN) 200 mg tablet Take 1 Tab by mouth every eight (8) hours as needed for Pain. 10/26/18  Yes Gurpreet Valle, DO   trolamine salicylate-aloe vera 09% (ASPERCREME) topical cream Apply  to affected area as needed for Pain. 10/26/18  Yes Rene Valle, DO   UBIDECARENONE (COQ-10 PO) Take  by mouth. Yes Provider, Historical   TURMERIC ROOT EXTRACT PO Take  by mouth. Yes Provider, Historical   PV W-O FRANKIE/FERROUS FUMARATE/FA (M-VIT PO) Take  by mouth. Yes Provider, Historical   Flaxseed Oil Oil by Does Not Apply route. Yes Provider, Historical   Calcium Citrate-Vitamin D3 (CITRACAL + D) 315-250 mg-unit Tab Take  by mouth every thirty (30) days. 2/22/10  Yes Provider, Historical     Patient Active Problem List    Diagnosis Date Noted    Chronic right shoulder pain 01/29/2021    Medicare annual wellness visit, subsequent 01/29/2021    History of pituitary adenoma 03/30/2020    Vitamin D deficiency 03/02/2018    Pure hypercholesterolemia 05/29/2012    Hearing loss in left ear 05/29/2012    Osteopenia 02/14/2011    Cervical spinal stenosis 02/22/2010    Basal cell carcinoma of skin 02/22/2010    Stenosis, spinal, lumbar 12/16/2009     Current Outpatient Medications   Medication Sig Dispense Refill    multivitamin (ONE A DAY) tablet Take 1 Tab by mouth daily.  ibuprofen (MOTRIN) 200 mg tablet Take 1 Tab by mouth every eight (8) hours as needed for Pain. 30 Tab prn    trolamine salicylate-aloe vera 96% (ASPERCREME) topical cream Apply  to affected area as needed for Pain. 35 g prn    UBIDECARENONE (COQ-10 PO) Take  by mouth.  TURMERIC ROOT EXTRACT PO Take  by mouth.  PV W-O FRANKIE/FERROUS FUMARATE/FA (M-VIT PO) Take  by mouth.  Flaxseed Oil Oil by Does Not Apply route.  Calcium Citrate-Vitamin D3 (CITRACAL + D) 315-250 mg-unit Tab Take  by mouth every thirty (30) days.        No Known Allergies  Past Medical History:   Diagnosis Date    Basal cell carcinoma of skin 2/22/2010    Basal cell carcinoma of skin     Cervical spinal stenosis 2/22/2010    Chronic right shoulder pain 1/29/2021    Diverticulosis of colon     Pituitary adenoma ([facility] [address] 12/4/2017    Pituitary disorders     Pure hypercholesterolemia 5/29/2012    Skin cancers     melanoma    Stenosis of lumbosacral spine     Unspecified disorder of the pituitary gland and its hypothalamic control      Past Surgical History:   Procedure Laterality Date    HX GYN      D&C's    HX HEENT      neck surgery as infant    HX OTHER SURGICAL      Excision of basal cell carcinoma.  [place] UNS ORAL SURG PROC BY REPORT       Social History     Tobacco Use    Smoking status: Never Smoker    Smokeless tobacco: Never Used   Substance Use Topics    Alcohol use: Yes     Alcohol/week: 4.0 standard drinks     Types: 4 Cans of beer per week     Comment: social       ROS    Objective:   No flowsheet data found.      [INSTRUCTIONS:  \"[x]\" Indicates a positive item  \"[]\" Indicates a negative item  -- DELETE ALL ITEMS NOT EXAMINED]    Constitutional: [x] Appears well-developed and well-nourished [x] No apparent distress      [] Abnormal -     Mental status: [x] Alert and awake  [x] Oriented to person/place/time [x] Able to follow commands    [] Abnormal -     Eyes:   EOM    [x]  Normal    [] Abnormal -   Sclera  [x]  Normal    [] Abnormal -          Discharge [x]  None visible   [] Abnormal -     HENT: [x] Normocephalic, atraumatic  [] Abnormal -   [x] Mouth/Throat: Mucous membranes are moist    External Ears [x] Normal  [] Abnormal -    Neck: [x] No visualized mass [] Abnormal -     Pulmonary/Chest: [x] Respiratory effort normal   [x] No visualized signs of difficulty breathing or respiratory distress        [] Abnormal -      Musculoskeletal:   [x] Normal gait with no signs of ataxia         [x] Normal range of motion of neck        [] Abnormal -     Neurological:        [x] No Facial Asymmetry (Cranial nerve 7 motor function) (limited exam due to video visit)          [x] No gaze palsy        [] Abnormal -          Skin: [x] No significant exanthematous lesions or discoloration noted on facial skin         [] Abnormal -            Psychiatric:       [x] Normal Affect [] Abnormal -        [x] No Hallucinations    Other pertinent observable physical exam findings:-        We discussed the expected course, resolution and complications of the diagnosis(es) in detail. Medication risks, benefits, costs, interactions, and alternatives were discussed as indicated. I advised her to contact the office if her condition worsens, changes or fails to improve as anticipated. She expressed understanding with the diagnosis(es) and plan. Dagoberto Dallas, who was evaluated through a patient-initiated, synchronous (real-time) audio-video encounter, and/or her healthcare decision maker, is aware that it is a billable service, with coverage as determined by her insurance carrier. She provided verbal consent to proceed: Yes, and patient identification was verified. It was conducted pursuant to the emergency declaration under the [address] [place] [address] authority and the [employer] General Act. A caregiver was present when appropriate. Ability to conduct physical exam was limited. I was at home. The patient was at home.       Rafael Setting, DO

## 2021-01-29 NOTE — PROGRESS NOTES
Health Maintenance Due   Topic Date Due    COVID-19 Vaccine (1 of 2) 03/30/1966    DTaP/Tdap/Td series (1 - Tdap) 03/30/1971    Pneumococcal 65+ years (1 of 1 - PPSV23) 02/14/2016    GLAUCOMA SCREENING Q2Y  02/27/2019    Medicare Yearly Exam  08/09/2020    Flu Vaccine (1) 09/01/2020       No chief complaint on file. 1. Have you been to the ER, urgent care clinic since your last visit? Hospitalized since your last visit? No    2. Have you seen or consulted any other health care providers outside of the [address] [place] [address] since your last visit? Include any pap smears or colon screening. No    3) Do you have an Advance Directive on file? no    4) Are you interested in receiving information on Advance Directives? NO      Patient is accompanied by self I have received verbal consent from Sue Cantrell to discuss any/all medical information while they are present in the room.

## 2021-01-29 NOTE — PROGRESS NOTES
Schedule of Personalized Health Plan  (Provide Copy to Patient)  The best way to stay healthy is to live a healthy lifestyle. A healthy lifestyle includes regular exercise, eating a well-balanced diet, keeping a healthy weight and not smoking. Regular physical exams and screening tests are another important way to take care of yourself. Preventive exams provided by health care providers can find health problems early when treatment works best and can keep you from getting certain diseases or illnesses. Preventive services include exams, lab tests, screenings, shots, monitoring and information to help you take care of your own health. All people over 65 should have a pneumonia shot. Pneumonia shots are usually only needed once in a lifetime unless your doctor decides differently. All people over 65 should have a yearly flu shot. People over 65 are at medium to high risk for Hepatitis B. Three shots are needed for complete protection. In addition to your physical exam, some screening tests are recommended:    Bone mass measurement (dexa scan) is recommended every two years  Diabetes Mellitus screening is recommended every year. Glaucoma is an eye disease caused by high pressure in the eye. An eye exam is recommended every year. Cardiovascular screening tests that check your cholesterol and other blood fat (lipid) levels are recommended every five years. Colorectal Cancer screening tests help to find pre-cancerous polyps (growths in the colon) so they can be removed before they turn into cancer. Tests ordered for screening depend on your personal and family history risk factors.     Screening for Breast Cancer is recommended yearly with a mammogram.    Screening for Cervical Cancer is recommended every two years (annually for certain risk factors, such as previous history of STD or abnormal PAP in past 7 years), with a Pelvic Exam with PAP    Here is a list of your current Health Maintenance items with a due date:  Health Maintenance   Topic Date Due    DTaP/Tdap/Td series (1 - Tdap) 03/30/1971    Pneumococcal 65+ years (1 of 1 - PPSV23) 02/14/2016    GLAUCOMA SCREENING Q2Y  02/27/2019    Flu Vaccine (1) 09/01/2020    COVID-19 Vaccine (2 of 2 - Pfizer series) 02/19/2021    Medicare Yearly Exam  01/30/2022    Breast Cancer Screen Mammogram  10/13/2022    Colorectal Cancer Screening Combo  04/04/2024    Lipid Screen  09/06/2024    Hepatitis C Screening  Completed    Bone Densitometry (Dexa) Screening  Completed    Shingrix Vaccine Age 50>  Completed       This is the Subsequent Medicare Annual Wellness Exam, performed 12 months or more after the Initial AWV or the last Subsequent AWV    I have reviewed the patient's medical history in detail and updated the computerized patient record. Depression Risk Factor Screening:     3 most recent PHQ Screens 1/29/2021   Little interest or pleasure in doing things Not at all   Feeling down, depressed, irritable, or hopeless Not at all   Total Score PHQ 2 0       Alcohol Risk Screen    Do you average more than 1 drink per night or more than 7 drinks a week:  No    On any one occasion in the past three months have you have had more than 3 drinks containing alcohol:  No        Functional Ability and Level of Safety:    Hearing: Hearing is good. Activities of Daily Living: The home contains: no safety equipment. Patient does total self care      Ambulation: with no difficulty     Fall Risk:  Fall Risk Assessment, last 12 mths 1/29/2021   Able to walk? Yes   Fall in past 12 months? 0   Do you feel unsteady?  0   Are you worried about falling 0      Abuse Screen:  Patient is not abused       Cognitive Screening    Has your family/caregiver stated any concerns about your memory: no     Cognitive Screening: A+O x 3    Assessment/Plan   Education and counseling provided:  Are appropriate based on today's review and evaluation    Diagnoses and all orders for this visit:    1. Chronic right shoulder pain    2. Chronic pain of left knee    3. History of pituitary adenoma    4. Vitamin D deficiency  -     VITAMIN D, 25 HYDROXY    5. Pure hypercholesterolemia  -     LIPID PANEL  -     METABOLIC PANEL, COMPREHENSIVE  -     CBC W/O DIFF    6. Medicare annual wellness visit, subsequent        Health Maintenance Due     Health Maintenance Due   Topic Date Due    DTaP/Tdap/Td series (1 - Tdap) 03/30/1971    Pneumococcal 65+ years (1 of 1 - PPSV23) 02/14/2016    GLAUCOMA SCREENING Q2Y  02/27/2019    Flu Vaccine (1) 09/01/2020       Patient Care Team   Patient Care Team:  Debi Luo DO as PCP - General  Debi Luo DO as PCP - [facility] Empaneled Provider    History     Patient Active Problem List   Diagnosis Code    Stenosis, spinal, lumbar M48.061    Cervical spinal stenosis M48.02    Basal cell carcinoma of skin C44.91    Osteopenia M85.80    Pure hypercholesterolemia E78.00    Hearing loss in left ear H91.92    Vitamin D deficiency E55.9    History of pituitary adenoma Z86.018    Chronic right shoulder pain M25.511, G89.29    Medicare annual wellness visit, subsequent Z00.00     Past Medical History:   Diagnosis Date    Basal cell carcinoma of skin 2/22/2010    Basal cell carcinoma of skin     Cervical spinal stenosis 2/22/2010    Chronic right shoulder pain 1/29/2021    Diverticulosis of colon     Pituitary adenoma ([facility] [address] 12/4/2017    Pituitary disorders     Pure hypercholesterolemia 5/29/2012    Skin cancers     melanoma    Stenosis of lumbosacral spine     Unspecified disorder of the pituitary gland and its hypothalamic control       Past Surgical History:   Procedure Laterality Date    HX GYN      D&C's    HX HEENT      neck surgery as infant    HX OTHER SURGICAL      Excision of basal cell carcinoma.     [place] UNS ORAL SURG PROC BY REPORT       Current Outpatient Medications   Medication Sig Dispense Refill    multivitamin (ONE A DAY) tablet Take 1 Tab by mouth daily.  ibuprofen (MOTRIN) 200 mg tablet Take 1 Tab by mouth every eight (8) hours as needed for Pain. 30 Tab prn    trolamine salicylate-aloe vera 24% (ASPERCREME) topical cream Apply  to affected area as needed for Pain. 35 g prn    UBIDECARENONE (COQ-10 PO) Take  by mouth.  TURMERIC ROOT EXTRACT PO Take  by mouth.  PV W-O FRANKIE/FERROUS FUMARATE/FA (M-VIT PO) Take  by mouth.  Flaxseed Oil Oil by Does Not Apply route.  Calcium Citrate-Vitamin D3 (CITRACAL + D) 315-250 mg-unit Tab Take  by mouth every thirty (30) days. No Known Allergies    Family History   Problem Relation Age of Onset    Hypertension Mother     Heart Disease Mother    [facility] Glaucoma Mother     Coronary Artery Disease Father     Emphysema Father     Cancer Maternal Grandmother          of rectal cancer. Social History     Tobacco Use    Smoking status: Never Smoker    Smokeless tobacco: Never Used   Substance Use Topics    Alcohol use:  Yes     Alcohol/week: 4.0 standard drinks     Types: 4 Cans of beer per week     Comment: social

## 2021-02-28 PROBLEM — Z00.00 MEDICARE ANNUAL WELLNESS VISIT, SUBSEQUENT: Status: RESOLVED | Noted: 2021-01-29 | Resolved: 2021-02-28

## 2021-04-02 ENCOUNTER — HOSPITAL ENCOUNTER (OUTPATIENT)
Dept: LAB | Age: 71
Discharge: HOME | End: 2021-04-02
Payer: MEDICARE

## 2021-04-02 PROCEDURE — 83036 HEMOGLOBIN GLYCOSYLATED A1C: CPT

## 2021-04-02 PROCEDURE — 36415 COLL VENOUS BLD VENIPUNCTURE: CPT

## 2021-04-02 PROCEDURE — 80061 LIPID PANEL: CPT

## 2021-04-02 PROCEDURE — 82306 VITAMIN D 25 HYDROXY: CPT

## 2021-04-02 PROCEDURE — 80053 COMPREHEN METABOLIC PANEL: CPT

## 2021-04-02 PROCEDURE — 85027 COMPLETE CBC AUTOMATED: CPT

## 2021-04-03 LAB
25(OH)D3+25(OH)D2 SERPL-MCNC: 28.7 NG/ML (ref 30–100)
ALBUMIN SERPL-MCNC: 4.3 G/DL (ref 3.7–4.7)
ALBUMIN/GLOB SERPL: 2.4 {RATIO} (ref 1.2–2.2)
ALP SERPL-CCNC: 58 IU/L (ref 39–117)
ALT SERPL-CCNC: 12 IU/L (ref 0–32)
AST SERPL-CCNC: 17 IU/L (ref 0–40)
BILIRUB SERPL-MCNC: 0.5 MG/DL (ref 0–1.2)
BUN SERPL-MCNC: 11 MG/DL (ref 8–27)
BUN/CREAT SERPL: 16 (ref 12–28)
CALCIUM SERPL-MCNC: 9.1 MG/DL (ref 8.7–10.3)
CHLORIDE SERPL-SCNC: 104 MMOL/L (ref 96–106)
CHOLEST SERPL-MCNC: 238 MG/DL (ref 100–199)
CO2 SERPL-SCNC: 25 MMOL/L (ref 20–29)
CREAT SERPL-MCNC: 0.7 MG/DL (ref 0.57–1)
ERYTHROCYTE [DISTWIDTH] IN BLOOD BY AUTOMATED COUNT: 12.3 % (ref 11.7–15.4)
GLOBULIN SER CALC-MCNC: 1.8 G/DL (ref 1.5–4.5)
GLUCOSE SERPL-MCNC: 105 MG/DL (ref 65–99)
HCT VFR BLD AUTO: 40 % (ref 34–46.6)
HDLC SERPL-MCNC: 70 MG/DL
HGB BLD-MCNC: 13.7 G/DL (ref 11.1–15.9)
IMP & REVIEW OF LAB RESULTS: NORMAL
LDLC SERPL CALC-MCNC: 132 MG/DL (ref 0–99)
MCH RBC QN AUTO: 31.2 PG (ref 26.6–33)
MCHC RBC AUTO-ENTMCNC: 34.3 G/DL (ref 31.5–35.7)
MCV RBC AUTO: 91 FL (ref 79–97)
PLATELET # BLD AUTO: 285 X10E3/UL (ref 150–450)
POTASSIUM SERPL-SCNC: 4 MMOL/L (ref 3.5–5.2)
PROT SERPL-MCNC: 6.1 G/DL (ref 6–8.5)
RBC # BLD AUTO: 4.39 X10E6/UL (ref 3.77–5.28)
SODIUM SERPL-SCNC: 142 MMOL/L (ref 134–144)
TRIGL SERPL-MCNC: 205 MG/DL (ref 0–149)
VLDLC SERPL CALC-MCNC: 36 MG/DL (ref 5–40)
WBC # BLD AUTO: 8.5 X10E3/UL (ref 3.4–10.8)

## 2021-04-05 NOTE — PROGRESS NOTES
Borderline high cholesterol ---- watch fatty food/exercise  Borderline high sugar -was just done fasting?   Add A1c if possible  Low vit D--- start Vit D3 1000 units daily    All other labs are okay

## 2021-04-08 LAB
HBA1C MFR BLD: 5.6 % (ref 4.8–5.6)
SPECIMEN STATUS REPORT, ROLRST: NORMAL

## 2022-03-18 PROBLEM — Z86.018 HISTORY OF PITUITARY ADENOMA: Status: ACTIVE | Noted: 2020-03-30

## 2022-03-19 PROBLEM — M25.511 CHRONIC RIGHT SHOULDER PAIN: Status: ACTIVE | Noted: 2021-01-29

## 2022-03-19 PROBLEM — G89.29 CHRONIC RIGHT SHOULDER PAIN: Status: ACTIVE | Noted: 2021-01-29

## 2022-03-19 PROBLEM — E55.9 VITAMIN D DEFICIENCY: Status: ACTIVE | Noted: 2018-03-02

## 2022-07-12 ENCOUNTER — OFFICE VISIT (OUTPATIENT)
Dept: INTERNAL MEDICINE CLINIC | Age: 72
End: 2022-07-12
Payer: MEDICARE

## 2022-07-12 VITALS
BODY MASS INDEX: 21.82 KG/M2 | HEIGHT: 64 IN | WEIGHT: 127.8 LBS | HEART RATE: 71 BPM | OXYGEN SATURATION: 97 % | DIASTOLIC BLOOD PRESSURE: 64 MMHG | SYSTOLIC BLOOD PRESSURE: 116 MMHG | RESPIRATION RATE: 18 BRPM | TEMPERATURE: 98.3 F

## 2022-07-12 DIAGNOSIS — M48.02 CERVICAL SPINAL STENOSIS: ICD-10-CM

## 2022-07-12 DIAGNOSIS — Z13.39 SCREENING FOR ALCOHOLISM: ICD-10-CM

## 2022-07-12 DIAGNOSIS — H91.92 HEARING LOSS OF LEFT EAR, UNSPECIFIED HEARING LOSS TYPE: ICD-10-CM

## 2022-07-12 DIAGNOSIS — M21.612 BILATERAL BUNIONS: Primary | ICD-10-CM

## 2022-07-12 DIAGNOSIS — G89.29 CHRONIC RIGHT SHOULDER PAIN: ICD-10-CM

## 2022-07-12 DIAGNOSIS — E55.9 VITAMIN D DEFICIENCY: ICD-10-CM

## 2022-07-12 DIAGNOSIS — M25.511 CHRONIC RIGHT SHOULDER PAIN: ICD-10-CM

## 2022-07-12 DIAGNOSIS — E78.00 PURE HYPERCHOLESTEROLEMIA: ICD-10-CM

## 2022-07-12 DIAGNOSIS — Z86.018 HISTORY OF PITUITARY ADENOMA: ICD-10-CM

## 2022-07-12 DIAGNOSIS — Z00.00 MEDICARE ANNUAL WELLNESS VISIT, SUBSEQUENT: ICD-10-CM

## 2022-07-12 DIAGNOSIS — M20.41 HAMMER TOES OF BOTH FEET: ICD-10-CM

## 2022-07-12 DIAGNOSIS — M20.42 HAMMER TOES OF BOTH FEET: ICD-10-CM

## 2022-07-12 DIAGNOSIS — M21.611 BILATERAL BUNIONS: Primary | ICD-10-CM

## 2022-07-12 PROCEDURE — G8510 SCR DEP NEG, NO PLAN REQD: HCPCS | Performed by: INTERNAL MEDICINE

## 2022-07-12 PROCEDURE — 1090F PRES/ABSN URINE INCON ASSESS: CPT | Performed by: INTERNAL MEDICINE

## 2022-07-12 PROCEDURE — 3017F COLORECTAL CA SCREEN DOC REV: CPT | Performed by: INTERNAL MEDICINE

## 2022-07-12 PROCEDURE — G9899 SCRN MAM PERF RSLTS DOC: HCPCS | Performed by: INTERNAL MEDICINE

## 2022-07-12 PROCEDURE — G8399 PT W/DXA RESULTS DOCUMENT: HCPCS | Performed by: INTERNAL MEDICINE

## 2022-07-12 PROCEDURE — G8420 CALC BMI NORM PARAMETERS: HCPCS | Performed by: INTERNAL MEDICINE

## 2022-07-12 PROCEDURE — 1101F PT FALLS ASSESS-DOCD LE1/YR: CPT | Performed by: INTERNAL MEDICINE

## 2022-07-12 PROCEDURE — G8427 DOCREV CUR MEDS BY ELIG CLIN: HCPCS | Performed by: INTERNAL MEDICINE

## 2022-07-12 PROCEDURE — G8536 NO DOC ELDER MAL SCRN: HCPCS | Performed by: INTERNAL MEDICINE

## 2022-07-12 PROCEDURE — 99214 OFFICE O/P EST MOD 30 MIN: CPT | Performed by: INTERNAL MEDICINE

## 2022-07-12 PROCEDURE — G0439 PPPS, SUBSEQ VISIT: HCPCS | Performed by: INTERNAL MEDICINE

## 2022-07-12 NOTE — PROGRESS NOTES
HISTORY OF PRESENT ILLNESS  Juvenal Youngblood is a 67 y.o. female. She is here for follow-up. Vital signs and weight are stable. BMI 21.94. History of pituitary adenoma, vitamin D deficiency, HLD, and hearing loss, right shoulder pain related to old injury. Her chronic right shoulder and upper arm pain has been worse recently. OTC meds helps some. Also reports bilateral foot pain with bunions and hammertoes. Her hearing loss has been getting worse. Interested in hearing aids. Otherwise chronic medical issues have been stable. Her  passed away recently. She lives in a care home community. She is on a few supplements but no prescription meds. Denies tobacco or alcohol use. Watching her diet and trying to be active physically as tolerated. Labs in 2021 were stable. She has had Covid vaccination. Denies any related issues   No other new complaints. HPI    Review of Systems   Constitutional: Negative. HENT: Positive for hearing loss. Eyes: Negative for blurred vision. Respiratory: Negative for cough and shortness of breath. Cardiovascular: Negative for chest pain and leg swelling. Gastrointestinal: Negative for abdominal pain and heartburn. Genitourinary: Negative for dysuria and frequency. Musculoskeletal: Positive for joint pain. Negative for falls. Skin: Negative. Neurological: Negative for dizziness, sensory change, focal weakness and headaches. Endo/Heme/Allergies: Negative. Psychiatric/Behavioral: Negative for depression. The patient is not nervous/anxious and does not have insomnia. All other systems reviewed and are negative. Physical Exam  Vitals and nursing note reviewed. Constitutional:       General: She is not in acute distress. Appearance: She is well-developed. Comments: Pleasant lady   HENT:      Head: Normocephalic and atraumatic. Mouth/Throat:      Mouth: Mucous membranes are moist.      Pharynx: Oropharynx is clear. Eyes:      General: No scleral icterus. Conjunctiva/sclera: Conjunctivae normal.      Pupils: Pupils are equal, round, and reactive to light. Neck:      Thyroid: No thyromegaly. Vascular: No carotid bruit or JVD. Cardiovascular:      Rate and Rhythm: Normal rate and regular rhythm. Heart sounds: Normal heart sounds. No murmur heard. Pulmonary:      Effort: Pulmonary effort is normal. No respiratory distress. Breath sounds: Normal breath sounds. No wheezing or rales. Abdominal:      General: Bowel sounds are normal. There is no distension. Palpations: Abdomen is soft. Tenderness: There is no abdominal tenderness. There is no right CVA tenderness or left CVA tenderness. Musculoskeletal:         General: Tenderness (Right shoulder/upper arm) present. Cervical back: Normal range of motion and neck supple. Tenderness (Mild, chronic) present. Right lower leg: No edema. Left lower leg: No edema. Comments: djd  Bilateral hand arthritis   Skin:     General: Skin is warm and dry. Findings: No rash. Neurological:      General: No focal deficit present. Mental Status: She is alert and oriented to person, place, and time. Mental status is at baseline. Coordination: Coordination normal.   Psychiatric:         Behavior: Behavior normal.         ASSESSMENT and PLAN  Diagnoses and all orders for this visit:    1. Bilateral bunions  -     REFERRAL TO PODIATRY  OTC Tylenol or NSAIDs as needed pain  2. Medicare annual wellness visit, subsequent  -     MD ANNUAL ALCOHOL SCREEN 15 MIN    3. Screening for alcoholism  Denies alcohol dependence  4. Hammer toes of both feet  -     REFERRAL TO PODIATRY    5. Hearing loss of left ear, unspecified hearing loss type  Refer to audiologist  6. Chronic right shoulder pain/tendinitis  -     REFERRAL TO PHYSICAL THERAPY  OTC Tylenol or NSAIDs as needed pain  7.  Cervical spinal stenosis  -     REFERRAL TO PHYSICAL THERAPY    8. Pure hypercholesterolemia  -     LIPID PANEL; Future  -     METABOLIC PANEL, COMPREHENSIVE; Future  -     CBC W/O DIFF; Future    9. History of pituitary adenoma    10. Vitamin D deficiency  -     VITAMIN D, 25 HYDROXY; Future      Follow-up and Dispositions    · Return in about 1 year (around 7/12/2023). All chronic medical problems are stable  Continue with current medical management and plan  lab results and schedule of future lab studies reviewed with patient  reviewed diet, exercise and weight control  reviewed medications and side effects in detail  F/u with other MD's/ providers as scheduled  COVID-19 precautions discussed with pt  An After Visit Summary was printed and given to the patient.

## 2022-07-12 NOTE — PATIENT INSTRUCTIONS

## 2022-07-12 NOTE — PROGRESS NOTES
Rm    Chief Complaint   Patient presents with    Annual Wellness Visit        Visit Vitals  /64 (BP 1 Location: Left upper arm, BP Patient Position: Sitting, BP Cuff Size: Adult)   Pulse 71   Temp 98.3 °F (36.8 °C) (Oral)   Resp 18   Ht 5' 4\" (1.626 m)   Wt 127 lb 12.8 oz (58 kg)   SpO2 97%   BMI 21.94 kg/m²        1. Have you been to the ER, urgent care clinic since your last visit? Hospitalized since your last visit? No    2. Have you seen or consulted any other health care providers outside of the [address] [place] [address] since your last visit? Include any pap smears or colon screening. No     Health Maintenance Due   Topic Date Due    DTaP/Tdap/Td series (1 - Tdap) Never done    Pneumococcal 65+ years (1 - PCV) Never done    COVID-19 Vaccine (2 - Pfizer 3-dose series) 02/19/2021    Depression Screen  01/29/2022    Medicare Yearly Exam  01/30/2022        3 most recent PHQ Screens 7/12/2022   Little interest or pleasure in doing things Not at all   Feeling down, depressed, irritable, or hopeless Not at all   Total Score PHQ 2 0        Fall Risk Assessment, last 12 mths 7/12/2022   Able to walk? No   Fall in past 12 months? -   Do you feel unsteady? -   Are you worried about falling -       Learning Assessment 8/9/2019   PRIMARY LEARNER Patient   HIGHEST LEVEL OF EDUCATION - PRIMARY LEARNER  > 4 YEARS OF COLLEGE   BARRIERS PRIMARY LEARNER VISUAL   CO-LEARNER CAREGIVER No   CO-LEARNER NAME No    PRIMARY LANGUAGE ENGLISH   LEARNER PREFERENCE PRIMARY OTHER (COMMENT)   LEARNING SPECIAL TOPICS -   ANSWERED BY Patient    RELATIONSHIP SELF                   This is the Subsequent Medicare Annual Wellness Exam, performed 12 months or more after the Initial AWV or the last Subsequent AWV    I have reviewed the patient's medical history in detail and updated the computerized patient record.        Assessment/Plan   Education and counseling provided:  Are appropriate based on today's review and evaluation        Depression Risk Factor Screening     3 most recent PHQ Screens 7/12/2022   Little interest or pleasure in doing things Not at all   Feeling down, depressed, irritable, or hopeless Not at all   Total Score PHQ 2 0       Alcohol & Drug Abuse Risk Screen    Do you average more than 1 drink per night or more than 7 drinks a week:  No    On any one occasion in the past three months have you have had more than 3 drinks containing alcohol:  No          Functional Ability and Level of Safety    Hearing: Hearing is good. Activities of Daily Living: The home contains: grab bars  Patient does total self care      Ambulation: with no difficulty     Fall Risk:  Fall Risk Assessment, last 12 mths 7/12/2022   Able to walk?  No   Fall in past 12 months? -   Do you feel unsteady? -   Are you worried about falling -      Abuse Screen:  Patient is not abused       Cognitive Screening    Has your family/caregiver stated any concerns about your memory: no     Cognitive Screening: Normal - Clock Drawing Test    Health Maintenance Due     Health Maintenance Due   Topic Date Due    DTaP/Tdap/Td series (1 - Tdap) Never done    Pneumococcal 65+ years (1 - PCV) Never done    COVID-19 Vaccine (2 - Pfizer 3-dose series) 02/19/2021    Depression Screen  01/29/2022    Medicare Yearly Exam  01/30/2022       Patient Care Team   Patient Care Team:  Jazmin Welch DO as PCP - General  Jazmin Welch DO as PCP - [facility] Empaneled Provider    History     Patient Active Problem List   Diagnosis Code    Stenosis, spinal, lumbar M48.061    Cervical spinal stenosis M48.02    Basal cell carcinoma of skin C44.91    Osteopenia M85.80    Pure hypercholesterolemia E78.00    Hearing loss in left ear H91.92    Vitamin D deficiency E55.9    History of pituitary adenoma Z86.018    Chronic right shoulder pain M25.511, G89.29     Past Medical History:   Diagnosis Date    Basal cell carcinoma of skin 2/22/2010    Basal cell carcinoma of skin     Cervical spinal stenosis 2010    Chronic right shoulder pain 2021    Diverticulosis of colon     Pituitary adenoma ([facility] [address] 2017    Pituitary disorders     Pure hypercholesterolemia 2012    Skin cancers     melanoma    Stenosis of lumbosacral spine     Unspecified disorder of the pituitary gland and its hypothalamic control       Past Surgical History:   Procedure Laterality Date    HX GYN      D&C's    HX HEENT      neck surgery as infant    HX OTHER SURGICAL      Excision of basal cell carcinoma.  VT UNS ORAL SURG PROC BY REPORT       Current Outpatient Medications   Medication Sig Dispense Refill    multivitamin (ONE A DAY) tablet Take 1 Tab by mouth daily.  TURMERIC ROOT EXTRACT PO Take  by mouth.  Flaxseed Oil Oil by Does Not Apply route.  Calcium Citrate-Vitamin D3 (CITRACAL + D) 315-250 mg-unit Tab Take  by mouth every thirty (30) days.  ibuprofen (MOTRIN) 200 mg tablet Take 1 Tab by mouth every eight (8) hours as needed for Pain. (Patient not taking: Reported on 2022) 30 Tab prn    trolamine salicylate-aloe vera 02% (ASPERCREME) topical cream Apply  to affected area as needed for Pain. (Patient not taking: Reported on 2022) 35 g prn    UBIDECARENONE (COQ-10 PO) Take  by mouth. (Patient not taking: Reported on 2022)      PV W-O FRANKIE/FERROUS FUMARATE/FA (M-VIT PO) Take  by mouth. (Patient not taking: Reported on 2022)       No Known Allergies    Family History   Problem Relation Age of Onset    Hypertension Mother     Heart Disease Mother    Cynthia Her Glaucoma Mother     Coronary Art Dis Father     Emphysema Father     Cancer Maternal Grandmother          of rectal cancer. Social History     Tobacco Use    Smoking status: Never Smoker    Smokeless tobacco: Never Used   Substance Use Topics    Alcohol use:  Yes     Alcohol/week: 4.0 standard drinks     Types: 4 Cans of beer per week     Comment: social Jackelyn Moreno

## 2022-07-12 NOTE — PROGRESS NOTES
Schedule of Personalized Health Plan  (Provide Copy to Patient)  The best way to stay healthy is to live a healthy lifestyle. A healthy lifestyle includes regular exercise, eating a well-balanced diet, keeping a healthy weight and not smoking. Regular physical exams and screening tests are another important way to take care of yourself. Preventive exams provided by health care providers can find health problems early when treatment works best and can keep you from getting certain diseases or illnesses. Preventive services include exams, lab tests, screenings, shots, monitoring and information to help you take care of your own health. All people over 65 should have a pneumonia shot. Pneumonia shots are usually only needed once in a lifetime unless your doctor decides differently. All people over 65 should have a yearly flu shot. People over 65 are at medium to high risk for Hepatitis B. Three shots are needed for complete protection. In addition to your physical exam, some screening tests are recommended:    Bone mass measurement (dexa scan) is recommended every two years  Diabetes Mellitus screening is recommended every year. Glaucoma is an eye disease caused by high pressure in the eye. An eye exam is recommended every year. Cardiovascular screening tests that check your cholesterol and other blood fat (lipid) levels are recommended every five years. Colorectal Cancer screening tests help to find pre-cancerous polyps (growths in the colon) so they can be removed before they turn into cancer. Tests ordered for screening depend on your personal and family history risk factors.     Screening for Breast Cancer is recommended yearly with a mammogram.    Screening for Cervical Cancer is recommended every two years (annually for certain risk factors, such as previous history of STD or abnormal PAP in past 7 years), with a Pelvic Exam with PAP    Here is a list of your current Health Maintenance items with a due date:  Health Maintenance   Topic Date Due    DTaP/Tdap/Td series (1 - Tdap) Never done    Pneumococcal 65+ years (1 - PCV) Never done    COVID-19 Vaccine (2 - Pfizer 3-dose series) 02/19/2021    Depression Screen  01/29/2022    Flu Vaccine (1) 09/01/2022    Breast Cancer Screen Mammogram  10/13/2022    Medicare Yearly Exam  07/13/2023    Colorectal Cancer Screening Combo  04/04/2024    Lipid Screen  04/02/2026    Hepatitis C Screening  Completed    Bone Densitometry (Dexa) Screening  Completed    Shingrix Vaccine Age 50>  Completed       This is the Subsequent Medicare Annual Wellness Exam, performed 12 months or more after the Initial AWV or the last Subsequent AWV    I have reviewed the patient's medical history in detail and updated the computerized patient record. Assessment/Plan   Education and counseling provided:  Are appropriate based on today's review and evaluation    1. Bilateral bunions  -     REFERRAL TO PODIATRY  2. Medicare annual wellness visit, subsequent  -     ND ANNUAL ALCOHOL SCREEN 15 MIN  3. Screening for alcoholism  4. Hammer toes of both feet  -     REFERRAL TO PODIATRY  5. Hearing loss of left ear, unspecified hearing loss type  6. Chronic right shoulder pain  7. Cervical spinal stenosis  8. Pure hypercholesterolemia  -     LIPID PANEL; Future  -     METABOLIC PANEL, COMPREHENSIVE; Future  -     CBC W/O DIFF; Future  9. History of pituitary adenoma  10.  Vitamin D deficiency  -     VITAMIN D, 25 HYDROXY; Future       Depression Risk Factor Screening     3 most recent PHQ Screens 7/12/2022   Little interest or pleasure in doing things Not at all   Feeling down, depressed, irritable, or hopeless Not at all   Total Score PHQ 2 0       Alcohol & Drug Abuse Risk Screen    Do you average more than 1 drink per night or more than 7 drinks a week:  No    On any one occasion in the past three months have you have had more than 3 drinks containing alcohol:  No Functional Ability and Level of Safety    Hearing: The patient needs further evaluation. Activities of Daily Living: The home contains: no safety equipment. Patient does total self care      Ambulation: with no difficulty     Fall Risk:  Fall Risk Assessment, last 12 mths 7/12/2022   Able to walk?  No   Fall in past 12 months? -   Do you feel unsteady? -   Are you worried about falling -      Abuse Screen:  Patient is not abused       Cognitive Screening    Has your family/caregiver stated any concerns about your memory: no     Cognitive Screening: A+O x 3    Health Maintenance Due     Health Maintenance Due   Topic Date Due    DTaP/Tdap/Td series (1 - Tdap) Never done    Pneumococcal 65+ years (1 - PCV) Never done    COVID-19 Vaccine (2 - Pfizer 3-dose series) 02/19/2021    Depression Screen  01/29/2022       Patient Care Team   Patient Care Team:  Allen Bedolla DO as PCP - General  Allen Bedolla DO as PCP - [facility] Empaneled Provider    History     Patient Active Problem List   Diagnosis Code    Stenosis, spinal, lumbar M48.061    Cervical spinal stenosis M48.02    Basal cell carcinoma of skin C44.91    Osteopenia M85.80    Pure hypercholesterolemia E78.00    Hearing loss in left ear H91.92    Vitamin D deficiency E55.9    History of pituitary adenoma Z86.018    Chronic right shoulder pain M25.511, G89.29    Hammer toes of both feet M20.41, M20.42    Bilateral bunions M21.611, M21.612     Past Medical History:   Diagnosis Date    Basal cell carcinoma of skin 2/22/2010    Basal cell carcinoma of skin     Cervical spinal stenosis 2/22/2010    Chronic right shoulder pain 1/29/2021    Diverticulosis of colon     Pituitary adenoma ([facility] [address] 12/4/2017    Pituitary disorders     Pure hypercholesterolemia 5/29/2012    Skin cancers     melanoma    Stenosis of lumbosacral spine     Unspecified disorder of the pituitary gland and its hypothalamic control       Past Surgical History:   Procedure Laterality Date    HX GYN      D&C's    HX HEENT      neck surgery as infant    HX OTHER SURGICAL      Excision of basal cell carcinoma.  MD UNS ORAL SURG PROC BY REPORT       Current Outpatient Medications   Medication Sig Dispense Refill    multivitamin (ONE A DAY) tablet Take 1 Tab by mouth daily.  TURMERIC ROOT EXTRACT PO Take  by mouth.  Flaxseed Oil Oil by Does Not Apply route.  Calcium Citrate-Vitamin D3 (CITRACAL + D) 315-250 mg-unit Tab Take  by mouth every thirty (30) days.  ibuprofen (MOTRIN) 200 mg tablet Take 1 Tab by mouth every eight (8) hours as needed for Pain. (Patient not taking: Reported on 2022) 30 Tab prn    trolamine salicylate-aloe vera 96% (ASPERCREME) topical cream Apply  to affected area as needed for Pain. (Patient not taking: Reported on 2022) 35 g prn    UBIDECARENONE (COQ-10 PO) Take  by mouth. (Patient not taking: Reported on 2022)      PV W-O FRANKIE/FERROUS FUMARATE/FA (M-VIT PO) Take  by mouth. (Patient not taking: Reported on 2022)       No Known Allergies    Family History   Problem Relation Age of Onset    Hypertension Mother     Heart Disease Mother    Vicente Loser Glaucoma Mother     Coronary Art Dis Father     Emphysema Father     Cancer Maternal Grandmother          of rectal cancer. Social History     Tobacco Use    Smoking status: Never Smoker    Smokeless tobacco: Never Used   Substance Use Topics    Alcohol use:  Yes     Alcohol/week: 4.0 standard drinks     Types: 4 Cans of beer per week     Comment: social Abril Watters,

## 2022-07-16 LAB
25(OH)D3+25(OH)D2 SERPL-MCNC: 38.7 NG/ML (ref 30–100)
ALBUMIN SERPL-MCNC: 4.4 G/DL (ref 3.7–4.7)
ALBUMIN/GLOB SERPL: 2.1 {RATIO} (ref 1.2–2.2)
ALP SERPL-CCNC: 56 IU/L (ref 44–121)
ALT SERPL-CCNC: 15 IU/L (ref 0–32)
AST SERPL-CCNC: 19 IU/L (ref 0–40)
BILIRUB SERPL-MCNC: 0.7 MG/DL (ref 0–1.2)
BUN SERPL-MCNC: 10 MG/DL (ref 8–27)
BUN/CREAT SERPL: 13 (ref 12–28)
CALCIUM SERPL-MCNC: 9.2 MG/DL (ref 8.7–10.3)
CHLORIDE SERPL-SCNC: 104 MMOL/L (ref 96–106)
CHOLEST SERPL-MCNC: 267 MG/DL (ref 100–199)
CO2 SERPL-SCNC: 22 MMOL/L (ref 20–29)
CREAT SERPL-MCNC: 0.78 MG/DL (ref 0.57–1)
EGFR: 81 ML/MIN/1.73
ERYTHROCYTE [DISTWIDTH] IN BLOOD BY AUTOMATED COUNT: 12.7 % (ref 11.7–15.4)
GLOBULIN SER CALC-MCNC: 2.1 G/DL (ref 1.5–4.5)
GLUCOSE SERPL-MCNC: 89 MG/DL (ref 65–99)
HCT VFR BLD AUTO: 40.6 % (ref 34–46.6)
HDLC SERPL-MCNC: 70 MG/DL
HGB BLD-MCNC: 13.7 G/DL (ref 11.1–15.9)
IMP & REVIEW OF LAB RESULTS: NORMAL
LDLC SERPL CALC-MCNC: 183 MG/DL (ref 0–99)
MCH RBC QN AUTO: 30.4 PG (ref 26.6–33)
MCHC RBC AUTO-ENTMCNC: 33.7 G/DL (ref 31.5–35.7)
MCV RBC AUTO: 90 FL (ref 79–97)
PLATELET # BLD AUTO: 283 X10E3/UL (ref 150–450)
POTASSIUM SERPL-SCNC: 4.7 MMOL/L (ref 3.5–5.2)
PROT SERPL-MCNC: 6.5 G/DL (ref 6–8.5)
RBC # BLD AUTO: 4.51 X10E6/UL (ref 3.77–5.28)
SODIUM SERPL-SCNC: 142 MMOL/L (ref 134–144)
TRIGL SERPL-MCNC: 83 MG/DL (ref 0–149)
VLDLC SERPL CALC-MCNC: 14 MG/DL (ref 5–40)
WBC # BLD AUTO: 6 X10E3/UL (ref 3.4–10.8)

## 2022-07-29 NOTE — PROGRESS NOTES
Cholesterol is elevated. Would recommend consideration of starting a cholesterol lowering medication. Per chart review, Dr. Geeta De previously recommended atorvastatin 10 mg po daily. If patient agreeable, please order. If medication is started, repeat CMP and lipid panel in 6-8 weeks.     Otherwise, stable labs

## 2022-08-01 ENCOUNTER — TELEPHONE (OUTPATIENT)
Dept: INTERNAL MEDICINE CLINIC | Age: 72
End: 2022-08-01

## 2022-08-01 NOTE — TELEPHONE ENCOUNTER
----- Message from Valencia Forrest NP sent at 7/29/2022  6:45 PM EDT -----  Cholesterol is elevated. Would recommend consideration of starting a cholesterol lowering medication. Per chart review, Dr. Libby Hayes previously recommended atorvastatin 10 mg po daily. If patient agreeable, please order. If medication is started, repeat CMP and lipid panel in 6-8 weeks.     Otherwise, stable labs

## 2022-08-01 NOTE — TELEPHONE ENCOUNTER
Called and spoke with pt, discussed lab results and recommendation to start statin medication for elevated cholesterol. Patient would like to work on diet before starting medication.  I advised I would let provider know and she should follow back up in 6 months for lab recheck

## 2023-03-22 ENCOUNTER — OFFICE VISIT (OUTPATIENT)
Dept: INTERNAL MEDICINE CLINIC | Age: 73
End: 2023-03-22
Payer: MEDICARE

## 2023-03-22 VITALS
HEIGHT: 64 IN | SYSTOLIC BLOOD PRESSURE: 132 MMHG | HEART RATE: 78 BPM | TEMPERATURE: 98 F | OXYGEN SATURATION: 99 % | DIASTOLIC BLOOD PRESSURE: 70 MMHG | RESPIRATION RATE: 16 BRPM | WEIGHT: 127 LBS | BODY MASS INDEX: 21.68 KG/M2

## 2023-03-22 DIAGNOSIS — Q21.12 PFO (PATENT FORAMEN OVALE): ICD-10-CM

## 2023-03-22 DIAGNOSIS — Z87.898 HISTORY OF BRADYCARDIA: ICD-10-CM

## 2023-03-22 DIAGNOSIS — I63.9 CRYPTOGENIC STROKE (HCC): Primary | ICD-10-CM

## 2023-03-22 DIAGNOSIS — E78.00 PURE HYPERCHOLESTEROLEMIA: ICD-10-CM

## 2023-03-22 PROCEDURE — G0463 HOSPITAL OUTPT CLINIC VISIT: HCPCS | Performed by: INTERNAL MEDICINE

## 2023-03-22 PROCEDURE — G8536 NO DOC ELDER MAL SCRN: HCPCS | Performed by: INTERNAL MEDICINE

## 2023-03-22 PROCEDURE — G8427 DOCREV CUR MEDS BY ELIG CLIN: HCPCS | Performed by: INTERNAL MEDICINE

## 2023-03-22 PROCEDURE — 1101F PT FALLS ASSESS-DOCD LE1/YR: CPT | Performed by: INTERNAL MEDICINE

## 2023-03-22 PROCEDURE — G8510 SCR DEP NEG, NO PLAN REQD: HCPCS | Performed by: INTERNAL MEDICINE

## 2023-03-22 PROCEDURE — G8399 PT W/DXA RESULTS DOCUMENT: HCPCS | Performed by: INTERNAL MEDICINE

## 2023-03-22 PROCEDURE — G8420 CALC BMI NORM PARAMETERS: HCPCS | Performed by: INTERNAL MEDICINE

## 2023-03-22 PROCEDURE — 1090F PRES/ABSN URINE INCON ASSESS: CPT | Performed by: INTERNAL MEDICINE

## 2023-03-22 PROCEDURE — 3017F COLORECTAL CA SCREEN DOC REV: CPT | Performed by: INTERNAL MEDICINE

## 2023-03-22 PROCEDURE — 99214 OFFICE O/P EST MOD 30 MIN: CPT | Performed by: INTERNAL MEDICINE

## 2023-03-22 RX ORDER — ATORVASTATIN CALCIUM 80 MG/1
TABLET, FILM COATED ORAL DAILY
COMMUNITY

## 2023-03-22 NOTE — PROGRESS NOTES
Health Maintenance Due   Topic Date Due    Pneumococcal 65+ years (1 - PCV) Never done    COVID-19 Vaccine (2 - Pfizer series) 02/19/2021    Flu Vaccine (1) 08/01/2022    Breast Cancer Screen Mammogram  10/13/2022       Chief Complaint   Patient presents with    Vitamin D Deficiency    Osteopenia    Follow Up Chronic Condition       1. Have you been to the ER, urgent care clinic since your last visit? Hospitalized since your last visit? YES MARCH 6TH, [facility], STROKE ADMITTED 6- 10TH    2. Have you seen or consulted any other health care providers outside of the [address] [place] [address] since your last visit? Include any pap smears or colon screening. No    3) Do you have an Advance Directive on file? no    4) Are you interested in receiving information on Advance Directives? NO      Patient is accompanied by self I have received verbal consent from Esther Russo to discuss any/all medical information while they are present in the room.

## 2023-03-22 NOTE — PROGRESS NOTES
Subjective  Neris Cherry is a 67 y.o. female. Pt. comes in for f/u. Has a few chronic medical issues as documented. Recent syncopal episode and fall hitting right side of forehead. Was admitted to [facility]. Found to have bradycardia in 20's. MRI of the brain brain showed subacute multifocal punctate infarcts of right anterior frontal lobe and Left posterior hippocampus (3/6/23). Echocardiogram showed PFO with good EF. Patient denies any focal neurological issues. Has been diagnosed with cryptogenic stroke. Followed by [facility] neurology and cardiology. Has a loop recorder. There was a question of possible paroxysmal A-fib. Reports feeling well and close to baseline. She is concerned about another episode. Has not been driving. Trying to increase activity gradually. Has been started on Lipitor and aspirin. Lives alone and independent. Denies any issues with  Covid-19. PMH/PSH/Allergies/Social History/medication list and most recent studies reviewed with patient. Total cholesterol and LDL has been high but has very high HDL as well. Tobacco use: No  Alcohol use: No  Reports compliance with medications and diet. Trying to be active physically as tolerated. Reports no other new c/o. Past Medical History:   Diagnosis Date    Basal cell carcinoma of skin 2/22/2010    Basal cell carcinoma of skin     Cervical spinal stenosis 2/22/2010    Chronic right shoulder pain 1/29/2021    Cryptogenic stroke ([facility] [address] 3/22/2023    Diverticulosis of colon     Pituitary adenoma ([facility] [address] 12/4/2017    Pituitary disorders     Pure hypercholesterolemia 5/29/2012    Skin cancers     melanoma    Stenosis of lumbosacral spine     Unspecified disorder of the pituitary gland and its hypothalamic control        No Known Allergies    Current Outpatient Medications on File Prior to Visit   Medication Sig Dispense Refill    atorvastatin (LIPITOR) 80 mg tablet Take  by mouth daily.       multivitamin (ONE A DAY) tablet Take 1 Tab by mouth daily. trolamine salicylate-aloe vera 65% (ASPERCREME) topical cream Apply  to affected area as needed for Pain. 35 g prn    UBIDECARENONE (COQ-10 PO) Take  by mouth. TURMERIC ROOT EXTRACT PO Take  by mouth. PV W-O FRANKIE/FERROUS FUMARATE/FA (M-VIT PO) Take  by mouth. Flaxseed Oil Oil by Does Not Apply route. calcium citrate-vitamin D3 (CITRACAL WITH VITAMIN D MAXIMUM) tablet Take  by mouth every thirty (30) days. [DISCONTINUED] ibuprofen (MOTRIN) 200 mg tablet Take 1 Tab by mouth every eight (8) hours as needed for Pain. 30 Tab prn     No current facility-administered medications on file prior to visit. Visit Vitals  Blood Pressure 132/70 (BP 1 Location: Left upper arm, BP Patient Position: Sitting, BP Cuff Size: Adult)   Pulse 78   Temperature 98 °F (36.7 °C) (Oral)   Respiration 16   Height 5' 4\" (1.626 m)   Weight 127 lb (57.6 kg)   Oxygen Saturation 99%   Body Mass Index 21.80 kg/m²             HPI  Review of Systems   Constitutional: Negative. HENT:  Positive for hearing loss. Eyes:  Negative for blurred vision. Respiratory:  Negative for cough and shortness of breath. Cardiovascular:  Negative for chest pain and leg swelling. Gastrointestinal:  Negative for abdominal pain and heartburn. Genitourinary:  Negative for dysuria and frequency. Musculoskeletal:  Positive for falls and joint pain. Skin: Negative. Neurological:  Negative for dizziness, sensory change, focal weakness and headaches. Endo/Heme/Allergies: Negative. Psychiatric/Behavioral:  Negative for depression. The patient is not nervous/anxious and does not have insomnia. All other systems reviewed and are negative. Objective  Physical Exam  Vitals and nursing note reviewed. Constitutional:       General: She is not in acute distress. Appearance: She is well-developed. Comments: Pleasant lady   HENT:      Head: Normocephalic.       Comments: Right forehead injury scar     Mouth/Throat:      Mouth: Mucous membranes are moist.      Pharynx: Oropharynx is clear. Eyes:      General: No scleral icterus. Conjunctiva/sclera: Conjunctivae normal.      Pupils: Pupils are equal, round, and reactive to light. Neck:      Thyroid: No thyromegaly. Vascular: No carotid bruit or JVD. Cardiovascular:      Rate and Rhythm: Normal rate and regular rhythm. Heart sounds: Normal heart sounds. No murmur heard. Pulmonary:      Effort: Pulmonary effort is normal. No respiratory distress. Breath sounds: Normal breath sounds. No wheezing or rales. Abdominal:      General: Bowel sounds are normal. There is no distension. Palpations: Abdomen is soft. Tenderness: There is no abdominal tenderness. There is no right CVA tenderness or left CVA tenderness. Musculoskeletal:         General: No tenderness. Cervical back: Normal range of motion and neck supple. Tenderness: Mild, chronic. Right lower leg: No edema. Left lower leg: No edema. Comments: djd  Bilateral hand arthritis   Skin:     General: Skin is warm and dry. Findings: No rash. Neurological:      General: No focal deficit present. Mental Status: She is alert and oriented to person, place, and time. Mental status is at baseline. Cranial Nerves: No cranial nerve deficit. Coordination: Coordination normal.      Gait: Gait normal.   Psychiatric:         Behavior: Behavior normal.        Assessment & Plan    ICD-10-CM ICD-9-CM    1. Cryptogenic stroke (HCC)  I63.9 434.91 LIPID PANEL      METABOLIC PANEL, COMPREHENSIVE      CBC W/O DIFF  Continue baby aspirin  See neurologist as scheduled      2. Pure hypercholesterolemia  E78.00 272.0 LIPID PANEL      METABOLIC PANEL, COMPREHENSIVE      CBC W/O DIFF  Continue Lipitor      3. PFO (patent foramen ovale)  Q21.12 745.5 Followed by cardiologist      4.  History of bradycardia  Z87.898 V12.59 Followed by cardiologist Orders Placed This Encounter    LIPID PANEL     Standing Status:   Future     Standing Expiration Date:   0/65/3043    METABOLIC PANEL, COMPREHENSIVE     Standing Status:   Future     Standing Expiration Date:   3/22/2024    CBC W/O DIFF     Standing Status:   Future     Standing Expiration Date:   3/22/2024     Follow-up and Dispositions    Return in about 6 months (around 9/22/2023). All chronic medical problems are stable  Continue with current medical management and plan  lab results and schedule of future lab studies reviewed with patient  reviewed diet, exercise and weight control  reviewed medications and side effects in detail  F/u with other MD's/ providers as scheduled  COVID-19 precautions discussed with pt  An After Visit Summary was printed and given to the patient.     Shannan Cortez DO

## 2023-03-27 ENCOUNTER — HOSPITAL ENCOUNTER (EMERGENCY)
Age: 73
Discharge: HOME | End: 2023-03-27
Attending: EMERGENCY MEDICINE
Payer: MEDICARE

## 2023-03-27 ENCOUNTER — APPOINTMENT (OUTPATIENT)
Dept: ULTRASOUND IMAGING | Age: 73
End: 2023-03-27
Attending: EMERGENCY MEDICINE
Payer: MEDICARE

## 2023-03-27 VITALS
DIASTOLIC BLOOD PRESSURE: 69 MMHG | OXYGEN SATURATION: 96 % | WEIGHT: 125 LBS | SYSTOLIC BLOOD PRESSURE: 121 MMHG | TEMPERATURE: 98.6 F | RESPIRATION RATE: 20 BRPM | BODY MASS INDEX: 21.34 KG/M2 | HEIGHT: 64 IN | HEART RATE: 114 BPM

## 2023-03-27 DIAGNOSIS — R11.2 NAUSEA AND VOMITING IN ADULT: ICD-10-CM

## 2023-03-27 DIAGNOSIS — R74.01 TRANSAMINITIS: Primary | ICD-10-CM

## 2023-03-27 DIAGNOSIS — T46.6X5A ADVERSE REACTION TO STATIN MEDICATION: ICD-10-CM

## 2023-03-27 LAB
ALBUMIN SERPL-MCNC: 3.5 G/DL (ref 3.5–5)
ALBUMIN/GLOB SERPL: 0.9 (ref 1.1–2.2)
ALP SERPL-CCNC: 125 U/L (ref 45–117)
ALT SERPL-CCNC: 439 U/L (ref 12–78)
ANION GAP SERPL CALC-SCNC: 5 MMOL/L (ref 5–15)
AST SERPL-CCNC: 386 U/L (ref 15–37)
ATRIAL RATE: 101 BPM
BASOPHILS # BLD: 0 K/UL (ref 0–0.1)
BASOPHILS NFR BLD: 0 % (ref 0–1)
BILIRUB SERPL-MCNC: 2.2 MG/DL (ref 0.2–1)
BUN SERPL-MCNC: 10 MG/DL (ref 6–20)
BUN/CREAT SERPL: 13 (ref 12–20)
CALCIUM SERPL-MCNC: 9 MG/DL (ref 8.5–10.1)
CALCULATED P AXIS, ECG09: 87 DEGREES
CALCULATED R AXIS, ECG10: 81 DEGREES
CALCULATED T AXIS, ECG11: 83 DEGREES
CHLORIDE SERPL-SCNC: 108 MMOL/L (ref 97–108)
CO2 SERPL-SCNC: 25 MMOL/L (ref 21–32)
COMMENT, HOLDF: NORMAL
CREAT SERPL-MCNC: 0.78 MG/DL (ref 0.55–1.02)
DIAGNOSIS, 93000: NORMAL
DIFFERENTIAL METHOD BLD: ABNORMAL
EOSINOPHIL # BLD: 0.1 K/UL (ref 0–0.4)
EOSINOPHIL NFR BLD: 1 % (ref 0–7)
ERYTHROCYTE [DISTWIDTH] IN BLOOD BY AUTOMATED COUNT: 12.1 % (ref 11.5–14.5)
GLOBULIN SER CALC-MCNC: 3.7 G/DL (ref 2–4)
GLUCOSE SERPL-MCNC: 109 MG/DL (ref 65–100)
HCT VFR BLD AUTO: 39.8 % (ref 35–47)
HGB BLD-MCNC: 13 G/DL (ref 11.5–16)
IMM GRANULOCYTES # BLD AUTO: 0 K/UL (ref 0–0.04)
IMM GRANULOCYTES NFR BLD AUTO: 0 % (ref 0–0.5)
LIPASE SERPL-CCNC: 277 U/L (ref 73–393)
LYMPHOCYTES # BLD: 0.4 K/UL (ref 0.8–3.5)
LYMPHOCYTES NFR BLD: 4 % (ref 12–49)
MCH RBC QN AUTO: 29.8 PG (ref 26–34)
MCHC RBC AUTO-ENTMCNC: 32.7 G/DL (ref 30–36.5)
MCV RBC AUTO: 91.3 FL (ref 80–99)
MONOCYTES # BLD: 0.5 K/UL (ref 0–1)
MONOCYTES NFR BLD: 5 % (ref 5–13)
NEUTS SEG # BLD: 9.2 K/UL (ref 1.8–8)
NEUTS SEG NFR BLD: 90 % (ref 32–75)
NRBC # BLD: 0 K/UL (ref 0–0.01)
NRBC BLD-RTO: 0 PER 100 WBC
P-R INTERVAL, ECG05: 160 MS
PLATELET # BLD AUTO: 279 K/UL (ref 150–400)
PLATELET COMMENTS,PCOM: ABNORMAL
PMV BLD AUTO: 10.2 FL (ref 8.9–12.9)
POTASSIUM SERPL-SCNC: 3.7 MMOL/L (ref 3.5–5.1)
PROT SERPL-MCNC: 7.2 G/DL (ref 6.4–8.2)
Q-T INTERVAL, ECG07: 330 MS
QRS DURATION, ECG06: 78 MS
QTC CALCULATION (BEZET), ECG08: 427 MS
RBC # BLD AUTO: 4.36 M/UL (ref 3.8–5.2)
RBC MORPH BLD: ABNORMAL
SAMPLES BEING HELD,HOLD: NORMAL
SODIUM SERPL-SCNC: 138 MMOL/L (ref 136–145)
TROPONIN I SERPL HS-MCNC: 3 NG/L (ref 0–37)
VENTRICULAR RATE, ECG03: 101 BPM
WBC # BLD AUTO: 10.2 K/UL (ref 3.6–11)

## 2023-03-27 PROCEDURE — 80053 COMPREHEN METABOLIC PANEL: CPT

## 2023-03-27 PROCEDURE — 99284 EMERGENCY DEPT VISIT MOD MDM: CPT

## 2023-03-27 PROCEDURE — 85025 COMPLETE CBC W/AUTO DIFF WBC: CPT

## 2023-03-27 PROCEDURE — 76705 ECHO EXAM OF ABDOMEN: CPT

## 2023-03-27 PROCEDURE — 93005 ELECTROCARDIOGRAM TRACING: CPT

## 2023-03-27 PROCEDURE — 84484 ASSAY OF TROPONIN QUANT: CPT

## 2023-03-27 PROCEDURE — 83690 ASSAY OF LIPASE: CPT

## 2023-03-27 PROCEDURE — 36415 COLL VENOUS BLD VENIPUNCTURE: CPT

## 2023-03-27 RX ORDER — ONDANSETRON 4 MG/1
4 TABLET, ORALLY DISINTEGRATING ORAL
Qty: 20 TABLET | Refills: 0 | Status: SHIPPED | OUTPATIENT
Start: 2023-03-27

## 2023-03-27 NOTE — ED TRIAGE NOTES
Pt comes to ED from home with reports of vomiting. Pt states she thinks this is a reaction to Lipitor or baby ASA.

## 2023-03-30 NOTE — ED PROVIDER NOTES
61-year-old female with recent cryptogenic CVA on 3/6 with no residual focal deficits, recently started on Lipitor, presents to the emergency department stating that since she has started this new medication she has been having nausea and vomiting. She denies any abdominal pain, fever, chills, diarrhea, constipation, dysuria or hematuria. Denies any suspicious food intake or recent travel or known sick contacts. Denies any history of prior GI issues. Vomiting   Pertinent negatives include no chills, no fever, no abdominal pain, no diarrhea, no headaches, no arthralgias, no myalgias, no cough and no headaches. Past Medical History:   Diagnosis Date    Basal cell carcinoma of skin 2010    Basal cell carcinoma of skin     Cervical spinal stenosis 2010    Chronic right shoulder pain 2021    Cryptogenic stroke ([facility] [address] 3/22/2023    Diverticulosis of colon     Pituitary adenoma ([facility] [address] 2017    Pituitary disorders     Pure hypercholesterolemia 2012    Skin cancers     melanoma    Stenosis of lumbosacral spine     Unspecified disorder of the pituitary gland and its hypothalamic control        Past Surgical History:   Procedure Laterality Date    HX GYN      D&C's    HX HEENT      neck surgery as infant    HX OTHER SURGICAL      Excision of basal cell carcinoma. IA UNS ORAL SURG PROC BY REPORT           Family History:   Problem Relation Age of Onset    Hypertension Mother     Heart Disease Mother     Glaucoma Mother     Coronary Art Dis Father     Emphysema Father     Cancer Maternal Grandmother          of rectal cancer. Social History     Socioeconomic History    Marital status:      Spouse name: Not on file    Number of children: Not on file    Years of education: Not on file    Highest education level: Not on file   Occupational History    Not on file   Tobacco Use    Smoking status: Never    Smokeless tobacco: Never   Substance and Sexual Activity    Alcohol use:  Yes Alcohol/week: 4.0 standard drinks     Types: 4 Cans of beer per week     Comment: social    Drug use: No    Sexual activity: Not on file   Other Topics Concern    Not on file   Social History Narrative    Not on file     Social Determinants of Health     Financial Resource Strain: Not on file   Food Insecurity: Not on file   Transportation Needs: Not on file   Physical Activity: Not on file   Stress: Not on file   Social Connections: Not on file   Intimate Partner Violence: Not on file   Housing Stability: Not on file         ALLERGIES: Patient has no known allergies. Review of Systems   Constitutional:  Positive for appetite change. Negative for activity change, chills and fever. HENT:  Negative for congestion, rhinorrhea, sinus pressure, sneezing and sore throat. Eyes:  Negative for photophobia and visual disturbance. Respiratory:  Negative for cough and shortness of breath. Cardiovascular:  Negative for chest pain. Gastrointestinal:  Positive for nausea and vomiting. Negative for abdominal pain, blood in stool, constipation and diarrhea. Genitourinary:  Negative for difficulty urinating, dysuria, flank pain, frequency, hematuria, menstrual problem, urgency, vaginal bleeding and vaginal discharge. Musculoskeletal:  Negative for arthralgias, back pain, myalgias and neck pain. Skin:  Negative for rash and wound. Neurological:  Negative for syncope, weakness, numbness and headaches. Psychiatric/Behavioral:  Negative for self-injury and suicidal ideas. All other systems reviewed and are negative. Vitals:    03/27/23 1806 03/27/23 2300   BP: 123/76 121/69   Pulse: (!) 114    Resp: 20    Temp: 98.6 °F (37 °C)    SpO2: 95% 96%   Weight: 56.7 kg (125 lb)    Height: 5' 4\" (1.626 m)             Physical Exam  Vitals and nursing note reviewed. Constitutional:       General: She is not in acute distress. Appearance: Normal appearance. She is well-developed. She is not diaphoretic. Comments: Very pleasant, no acute distress, speaking in full sentences. HENT:      Head: Normocephalic and atraumatic. Nose: Nose normal.   Eyes:      Extraocular Movements: Extraocular movements intact. Conjunctiva/sclera: Conjunctivae normal.      Pupils: Pupils are equal, round, and reactive to light. Cardiovascular:      Rate and Rhythm: Normal rate and regular rhythm. Heart sounds: Normal heart sounds. Pulmonary:      Effort: Pulmonary effort is normal.      Breath sounds: Normal breath sounds. Abdominal:      General: There is no distension. Palpations: Abdomen is soft. Tenderness: There is no abdominal tenderness. Musculoskeletal:         General: No tenderness. Cervical back: Neck supple. Skin:     General: Skin is warm and dry. Neurological:      General: No focal deficit present. Mental Status: She is alert and oriented to person, place, and time. Cranial Nerves: No cranial nerve deficit. Sensory: No sensory deficit. Motor: No weakness. Coordination: Coordination normal.        Medical Decision Making  Amount and/or Complexity of Data Reviewed  Radiology: ordered. Risk  Prescription drug management. ED Course as of 03/30/23 1452   Mon Mar 27, 2023   1838 ECG performed at 1815 shows sinus tachycardia, rate of 101, normal intervals, no STEMI. [WG]      ED Course User Index  [WG] Edsonjeffrey Celestin DO     70-year-old female with recent cryptogenic CVA started on Lipitor and since with nausea and vomiting. Benign abdomen without guarding or peritonitis. She is mildly tachycardic on arrival and improved with symptomatic treatment and IV fluids. Labs returned showing transaminitis with T. bili 2.2, , , alk phos 125, but normal renal function, electrolytes, bicarb and anion gap. No leukocytosis or anemia. Normal lipase, negative troponin.     Right upper quadrant ultrasound shows no acute abnormalities and no biliary obstruction. Suspect that her laboratory abnormalities and symptoms are secondary to Lipitor. She was recommended cessation of this medication and close PCP follow-up for further evaluation and to discuss other options. she was Rx'd Zofran for further symptomatic relief. Return precautions were given for worsening or concerns. This plan was discussed with the patient and her daughter at the bedside and they stated with understanding and agreement. Please note that this dictation was completed with [employer], the computer voice recognition software. Quite often unanticipated grammatical, syntax, homophones, and other interpretive errors are inadvertently transcribed by the computer software. Please disregard these errors. Please excuse any errors that have escaped final proofreading.     Procedures

## 2023-04-04 ENCOUNTER — VIRTUAL VISIT (OUTPATIENT)
Dept: INTERNAL MEDICINE CLINIC | Age: 73
End: 2023-04-04
Payer: MEDICARE

## 2023-04-04 DIAGNOSIS — Q21.12 PFO (PATENT FORAMEN OVALE): ICD-10-CM

## 2023-04-04 DIAGNOSIS — E78.00 PURE HYPERCHOLESTEROLEMIA: ICD-10-CM

## 2023-04-04 DIAGNOSIS — I63.9 CRYPTOGENIC STROKE (HCC): Primary | ICD-10-CM

## 2023-04-04 DIAGNOSIS — M85.80 OSTEOPENIA, UNSPECIFIED LOCATION: ICD-10-CM

## 2023-04-04 PROCEDURE — G0463 HOSPITAL OUTPT CLINIC VISIT: HCPCS | Performed by: INTERNAL MEDICINE

## 2023-04-04 RX ORDER — EZETIMIBE 10 MG/1
10 TABLET ORAL DAILY
Qty: 30 TABLET | Refills: 5 | Status: SHIPPED | OUTPATIENT
Start: 2023-04-04

## 2023-04-04 NOTE — PROGRESS NOTES
Virgilio Marcus (: 1950) is a 68 y.o. female, established patient, here for evaluation of the following chief complaint(s):   Hospital Follow Up       ASSESSMENT/PLAN:  Below is the assessment and plan developed based on review of pertinent history, labs, studies, and medications. 1. Cryptogenic stroke ([facility] [address]  2. PFO (patent foramen ovale)  3. Pure hypercholesterolemia  4. Osteopenia, unspecified location      Return in about 6 months (around 10/4/2023).     SUBJECTIVE/OBJECTIVE:  HPI    Review of Systems     No data recorded     Physical Exam    [INSTRUCTIONS:  \"[x]\" Indicates a positive item  \"[]\" Indicates a negative item  -- DELETE ALL ITEMS NOT EXAMINED]    Constitutional: [x] Appears well-developed and well-nourished [x] No apparent distress      [] Abnormal -     Mental status: [x] Alert and awake  [x] Oriented to person/place/time [x] Able to follow commands    [] Abnormal -     Eyes:   EOM    [x]  Normal    [] Abnormal -   Sclera  [x]  Normal    [] Abnormal -          Discharge [x]  None visible   [] Abnormal -     HENT: [x] Normocephalic, atraumatic  [] Abnormal -   [x] Mouth/Throat: Mucous membranes are moist    External Ears [x] Normal  [] Abnormal -    Neck: [x] No visualized mass [] Abnormal -     Pulmonary/Chest: [x] Respiratory effort normal   [x] No visualized signs of difficulty breathing or respiratory distress        [] Abnormal -      Musculoskeletal:   [x] Normal gait with no signs of ataxia         [x] Normal range of motion of neck        [] Abnormal -     Neurological:        [x] No Facial Asymmetry (Cranial nerve 7 motor function) (limited exam due to video visit)          [x] No gaze palsy        [] Abnormal -          Skin:        [x] No significant exanthematous lesions or discoloration noted on facial skin         [] Abnormal -            Psychiatric:       [x] Normal Affect [] Abnormal -        [x] No Hallucinations    Other pertinent observable physical exam findings:-    On this date 04/04/2023 I have spent 30 minutes reviewing previous notes, test results and face to face (virtual) with the patient discussing the diagnosis and importance of compliance with the treatment plan as well as documenting on the day of the visit. Austin Viera, was evaluated through a synchronous (real-time) audio-video encounter. The patient (or guardian if applicable) is aware that this is a billable service, which includes applicable co-pays. This Virtual Visit was conducted with patient's (and/or legal guardian's) consent. The visit was conducted pursuant to the emergency declaration under the [address] [place] [address], [address] [place] [address] authority and the [employer] General Act. Patient identification was verified, and a caregiver was present when appropriate. The patient was located at: Home: [address] [facility] [address] [place] [address],[address] [address]  The provider was located at: Facility ([facility] Department): [address] [place] [id number]       An electronic signature was used to authenticate this note.   -- Chari De Guzman, DO

## 2023-04-04 NOTE — PROGRESS NOTES
Health Maintenance Due   Topic Date Due    Pneumococcal 65+ years (1 - PCV) Never done    COVID-19 Vaccine (2 - Pfizer series) 02/19/2021    Breast Cancer Screen Mammogram  10/13/2022       No chief complaint on file. 1. Have you been to the ER, urgent care clinic since your last visit? Hospitalized since your last visit? YES 3/27/23   [facility]    2. Have you seen or consulted any other health care providers outside of the [address] [place] [address] since your last visit? Include any pap smears or colon screening. No    3) Do you have an Advance Directive on file? no    4) Are you interested in receiving information on Advance Directives? NO      Patient is accompanied by self I have received verbal consent from Sharmaine uRiz to discuss any/all medical information while they are present in the room.

## 2023-05-20 LAB
ALBUMIN SERPL-MCNC: 4.5 G/DL (ref 3.7–4.7)
ALBUMIN/GLOB SERPL: 2 {RATIO} (ref 1.2–2.2)
ALP SERPL-CCNC: 61 IU/L (ref 44–121)
ALT SERPL-CCNC: 20 IU/L (ref 0–32)
AST SERPL-CCNC: 23 IU/L (ref 0–40)
BILIRUB SERPL-MCNC: 1.1 MG/DL (ref 0–1.2)
BUN SERPL-MCNC: 11 MG/DL (ref 8–27)
BUN/CREAT SERPL: 14 (ref 12–28)
CALCIUM SERPL-MCNC: 9.2 MG/DL (ref 8.7–10.3)
CHLORIDE SERPL-SCNC: 104 MMOL/L (ref 96–106)
CHOLEST SERPL-MCNC: 215 MG/DL (ref 100–199)
CO2 SERPL-SCNC: 23 MMOL/L (ref 20–29)
CREAT SERPL-MCNC: 0.81 MG/DL (ref 0.57–1)
EGFRCR SERPLBLD CKD-EPI 2021: 77 ML/MIN/1.73
ERYTHROCYTE [DISTWIDTH] IN BLOOD BY AUTOMATED COUNT: 12.7 % (ref 11.7–15.4)
GLOBULIN SER CALC-MCNC: 2.3 G/DL (ref 1.5–4.5)
GLUCOSE SERPL-MCNC: 84 MG/DL (ref 70–99)
HCT VFR BLD AUTO: 42.2 % (ref 34–46.6)
HDLC SERPL-MCNC: 68 MG/DL
HGB BLD-MCNC: 13.7 G/DL (ref 11.1–15.9)
IMP & REVIEW OF LAB RESULTS: NORMAL
LDLC SERPL CALC-MCNC: 131 MG/DL (ref 0–99)
MCH RBC QN AUTO: 30.4 PG (ref 26.6–33)
MCHC RBC AUTO-ENTMCNC: 32.5 G/DL (ref 31.5–35.7)
MCV RBC AUTO: 94 FL (ref 79–97)
PLATELET # BLD AUTO: 267 X10E3/UL (ref 150–450)
POTASSIUM SERPL-SCNC: 4.1 MMOL/L (ref 3.5–5.2)
PROT SERPL-MCNC: 6.8 G/DL (ref 6–8.5)
RBC # BLD AUTO: 4.5 X10E6/UL (ref 3.77–5.28)
SODIUM SERPL-SCNC: 141 MMOL/L (ref 134–144)
TRIGL SERPL-MCNC: 90 MG/DL (ref 0–149)
VLDLC SERPL CALC-MCNC: 16 MG/DL (ref 5–40)
WBC # BLD AUTO: 6.3 X10E3/UL (ref 3.4–10.8)

## 2023-07-21 ENCOUNTER — OFFICE VISIT (OUTPATIENT)
Age: 73
End: 2023-07-21
Payer: MEDICARE

## 2023-07-21 VITALS
TEMPERATURE: 96.8 F | OXYGEN SATURATION: 99 % | HEART RATE: 84 BPM | DIASTOLIC BLOOD PRESSURE: 68 MMHG | HEIGHT: 64 IN | SYSTOLIC BLOOD PRESSURE: 132 MMHG | WEIGHT: 122 LBS | BODY MASS INDEX: 20.83 KG/M2 | RESPIRATION RATE: 16 BRPM

## 2023-07-21 DIAGNOSIS — Z00.00 MEDICARE ANNUAL WELLNESS VISIT, SUBSEQUENT: ICD-10-CM

## 2023-07-21 DIAGNOSIS — Z87.74 S/P PATENT FORAMEN OVALE CLOSURE: ICD-10-CM

## 2023-07-21 DIAGNOSIS — E78.00 PURE HYPERCHOLESTEROLEMIA: ICD-10-CM

## 2023-07-21 DIAGNOSIS — I63.9 CRYPTOGENIC STROKE (HCC): Primary | ICD-10-CM

## 2023-07-21 DIAGNOSIS — H91.92 HEARING LOSS OF LEFT EAR, UNSPECIFIED HEARING LOSS TYPE: ICD-10-CM

## 2023-07-21 DIAGNOSIS — M54.31 RIGHT SIDED SCIATICA: ICD-10-CM

## 2023-07-21 PROBLEM — M25.551 RIGHT HIP PAIN: Status: ACTIVE | Noted: 2023-07-21

## 2023-07-21 PROCEDURE — G8420 CALC BMI NORM PARAMETERS: HCPCS | Performed by: INTERNAL MEDICINE

## 2023-07-21 PROCEDURE — G8427 DOCREV CUR MEDS BY ELIG CLIN: HCPCS | Performed by: INTERNAL MEDICINE

## 2023-07-21 PROCEDURE — 99214 OFFICE O/P EST MOD 30 MIN: CPT | Performed by: INTERNAL MEDICINE

## 2023-07-21 PROCEDURE — G8399 PT W/DXA RESULTS DOCUMENT: HCPCS | Performed by: INTERNAL MEDICINE

## 2023-07-21 PROCEDURE — 1123F ACP DISCUSS/DSCN MKR DOCD: CPT | Performed by: INTERNAL MEDICINE

## 2023-07-21 PROCEDURE — G0439 PPPS, SUBSEQ VISIT: HCPCS | Performed by: INTERNAL MEDICINE

## 2023-07-21 PROCEDURE — 1036F TOBACCO NON-USER: CPT | Performed by: INTERNAL MEDICINE

## 2023-07-21 PROCEDURE — 1090F PRES/ABSN URINE INCON ASSESS: CPT | Performed by: INTERNAL MEDICINE

## 2023-07-21 PROCEDURE — 3017F COLORECTAL CA SCREEN DOC REV: CPT | Performed by: INTERNAL MEDICINE

## 2023-07-21 RX ORDER — MULTIVIT-MIN/FOLIC ACID/BIOTIN 200-300MCG
TABLET,CHEWABLE ORAL
COMMUNITY
Start: 2022-07-01

## 2023-07-21 RX ORDER — EZETIMIBE 10 MG/1
10 TABLET ORAL DAILY
COMMUNITY
Start: 2023-04-04

## 2023-07-21 SDOH — ECONOMIC STABILITY: HOUSING INSECURITY
IN THE LAST 12 MONTHS, WAS THERE A TIME WHEN YOU DID NOT HAVE A STEADY PLACE TO SLEEP OR SLEPT IN A SHELTER (INCLUDING NOW)?: NO

## 2023-07-21 SDOH — ECONOMIC STABILITY: FOOD INSECURITY: WITHIN THE PAST 12 MONTHS, YOU WORRIED THAT YOUR FOOD WOULD RUN OUT BEFORE YOU GOT MONEY TO BUY MORE.: NEVER TRUE

## 2023-07-21 SDOH — ECONOMIC STABILITY: FOOD INSECURITY: WITHIN THE PAST 12 MONTHS, THE FOOD YOU BOUGHT JUST DIDN'T LAST AND YOU DIDN'T HAVE MONEY TO GET MORE.: NEVER TRUE

## 2023-07-21 SDOH — ECONOMIC STABILITY: INCOME INSECURITY: HOW HARD IS IT FOR YOU TO PAY FOR THE VERY BASICS LIKE FOOD, HOUSING, MEDICAL CARE, AND HEATING?: NOT VERY HARD

## 2023-07-21 ASSESSMENT — ENCOUNTER SYMPTOMS
ABDOMINAL PAIN: 0
RESPIRATORY NEGATIVE: 1
ALLERGIC/IMMUNOLOGIC NEGATIVE: 1
EYES NEGATIVE: 1
SHORTNESS OF BREATH: 0
GASTROINTESTINAL NEGATIVE: 1
BACK PAIN: 1

## 2023-07-21 ASSESSMENT — PATIENT HEALTH QUESTIONNAIRE - PHQ9
SUM OF ALL RESPONSES TO PHQ QUESTIONS 1-9: 0
1. LITTLE INTEREST OR PLEASURE IN DOING THINGS: 0
SUM OF ALL RESPONSES TO PHQ QUESTIONS 1-9: 0
SUM OF ALL RESPONSES TO PHQ9 QUESTIONS 1 & 2: 0
2. FEELING DOWN, DEPRESSED OR HOPELESS: 0
SUM OF ALL RESPONSES TO PHQ QUESTIONS 1-9: 0
SUM OF ALL RESPONSES TO PHQ QUESTIONS 1-9: 0

## 2023-07-21 ASSESSMENT — ANXIETY QUESTIONNAIRES
7. FEELING AFRAID AS IF SOMETHING AWFUL MIGHT HAPPEN: 0
6. BECOMING EASILY ANNOYED OR IRRITABLE: 0
2. NOT BEING ABLE TO STOP OR CONTROL WORRYING: 0
3. WORRYING TOO MUCH ABOUT DIFFERENT THINGS: 0
1. FEELING NERVOUS, ANXIOUS, OR ON EDGE: 0
5. BEING SO RESTLESS THAT IT IS HARD TO SIT STILL: 0
4. TROUBLE RELAXING: 0
IF YOU CHECKED OFF ANY PROBLEMS ON THIS QUESTIONNAIRE, HOW DIFFICULT HAVE THESE PROBLEMS MADE IT FOR YOU TO DO YOUR WORK, TAKE CARE OF THINGS AT HOME, OR GET ALONG WITH OTHER PEOPLE: NOT DIFFICULT AT ALL
GAD7 TOTAL SCORE: 0

## 2023-07-22 LAB
ALT SERPL-CCNC: 16 IU/L (ref 0–32)
AST SERPL-CCNC: 20 IU/L (ref 0–40)
CHOLEST SERPL-MCNC: 239 MG/DL (ref 100–199)
HDLC SERPL-MCNC: 76 MG/DL
IMP & REVIEW OF LAB RESULTS: NORMAL
LDLC SERPL CALC-MCNC: 138 MG/DL (ref 0–99)
TRIGL SERPL-MCNC: 144 MG/DL (ref 0–149)
VLDLC SERPL CALC-MCNC: 25 MG/DL (ref 5–40)

## 2023-09-26 ENCOUNTER — PATIENT MESSAGE (OUTPATIENT)
Age: 73
End: 2023-09-26

## 2023-09-28 RX ORDER — EZETIMIBE 10 MG/1
10 TABLET ORAL DAILY
Qty: 90 TABLET | Refills: 1 | Status: SHIPPED | OUTPATIENT
Start: 2023-09-28

## 2024-01-14 NOTE — PROGRESS NOTES
Subjective  Lonnie Gordon is a 71 y.o. female. She is here for her yearly checkup. History of pituitary adenoma. Followed by endocrinologist but off medications. Denies any related symptoms. Concerned about prominent right medial clavicle. It is chronic. No obvious trauma. Occasional pain. No tobacco or alcohol use. Reports watching her diet and be active physically. Weight is stable. Due for repeat blood work. On no prescription medications. No other new complaints. HPI  Review of Systems   Constitutional: Negative. HENT: Positive for hearing loss. Eyes: Negative for blurred vision. Respiratory: Negative for cough and shortness of breath. Cardiovascular: Negative for chest pain and leg swelling. Gastrointestinal: Negative for abdominal pain and heartburn. Genitourinary: Negative for dysuria and frequency. Musculoskeletal: Positive for joint pain. Negative for falls. Skin: Negative. Neurological: Negative for dizziness, sensory change, focal weakness and headaches. Endo/Heme/Allergies: Negative. Psychiatric/Behavioral: Negative for depression. The patient is not nervous/anxious and does not have insomnia. All other systems reviewed and are negative. Objective  Physical Exam   Constitutional: She is oriented to person, place, and time. She appears well-developed and well-nourished. No distress. HENT:   Head: Normocephalic and atraumatic. Mouth/Throat: Oropharynx is clear and moist.   Eyes: Conjunctivae are normal. No scleral icterus. Neck: Normal range of motion. Neck supple. No JVD present. No thyromegaly present. Cardiovascular: Normal rate, regular rhythm, normal heart sounds and intact distal pulses. No murmur heard. Pulmonary/Chest: Effort normal and breath sounds normal. No respiratory distress. She has no wheezes. She has no rales. Abdominal: Soft. Bowel sounds are normal. She exhibits no distension. There is no tenderness.    Musculoskeletal: She exhibits no edema or tenderness. djd  Bilateral hand arthritis  Prominent medial aspect of right clavicle   Neurological: She is alert and oriented to person, place, and time. Coordination normal.   Skin: Skin is warm and dry. No rash noted. Psychiatric: She has a normal mood and affect. Her behavior is normal.   Nursing note and vitals reviewed. Assessment & Plan  Diagnoses and all orders for this visit:    1. Pure hypercholesterolemia  -     LIPID PANEL  -     METABOLIC PANEL, COMPREHENSIVE  -     CBC W/O DIFF    2. Pituitary adenoma ([facility] [address]    3. Medicare annual wellness visit, subsequent    4. Pain of right clavicle  -     XR CLAVICLE RT; Future    5. Vitamin D deficiency  -     VITAMIN D, 25 HYDROXY    6. Need for hepatitis C screening test  -     HEPATITIS C AB      Follow-up and Dispositions    · Return in about 1 year (around 8/9/2020).      lab results and schedule of future lab studies reviewed with patient  reviewed diet, exercise and weight control  reviewed medications and side effects in detail  Reassurance that everything looks okay  Overall stable      Rica Morfin,  PAST MEDICAL HISTORY:  Carpal tunnel syndrome of right wrist     Cerebrovascular accident     Diabetes     HLD (hyperlipidemia)     HTN (hypertension)     OA (osteoarthritis) of finger, right     Shoulder impingement, left

## 2024-01-17 ENCOUNTER — OFFICE VISIT (OUTPATIENT)
Age: 74
End: 2024-01-17
Payer: MEDICARE

## 2024-01-17 VITALS
WEIGHT: 127 LBS | RESPIRATION RATE: 16 BRPM | HEART RATE: 80 BPM | DIASTOLIC BLOOD PRESSURE: 68 MMHG | HEIGHT: 64 IN | SYSTOLIC BLOOD PRESSURE: 122 MMHG | OXYGEN SATURATION: 97 % | BODY MASS INDEX: 21.68 KG/M2 | TEMPERATURE: 98 F

## 2024-01-17 DIAGNOSIS — I63.9 CRYPTOGENIC STROKE (HCC): ICD-10-CM

## 2024-01-17 DIAGNOSIS — M21.612 BILATERAL BUNIONS: ICD-10-CM

## 2024-01-17 DIAGNOSIS — Z87.74 S/P PATENT FORAMEN OVALE CLOSURE: ICD-10-CM

## 2024-01-17 DIAGNOSIS — Z78.9 STATIN INTOLERANCE: ICD-10-CM

## 2024-01-17 DIAGNOSIS — E78.00 PURE HYPERCHOLESTEROLEMIA: ICD-10-CM

## 2024-01-17 DIAGNOSIS — M21.611 BILATERAL BUNIONS: ICD-10-CM

## 2024-01-17 DIAGNOSIS — E78.00 PURE HYPERCHOLESTEROLEMIA: Primary | ICD-10-CM

## 2024-01-17 PROCEDURE — 1123F ACP DISCUSS/DSCN MKR DOCD: CPT | Performed by: INTERNAL MEDICINE

## 2024-01-17 PROCEDURE — G8399 PT W/DXA RESULTS DOCUMENT: HCPCS | Performed by: INTERNAL MEDICINE

## 2024-01-17 PROCEDURE — 99214 OFFICE O/P EST MOD 30 MIN: CPT | Performed by: INTERNAL MEDICINE

## 2024-01-17 PROCEDURE — 3017F COLORECTAL CA SCREEN DOC REV: CPT | Performed by: INTERNAL MEDICINE

## 2024-01-17 PROCEDURE — G8484 FLU IMMUNIZE NO ADMIN: HCPCS | Performed by: INTERNAL MEDICINE

## 2024-01-17 PROCEDURE — 1090F PRES/ABSN URINE INCON ASSESS: CPT | Performed by: INTERNAL MEDICINE

## 2024-01-17 PROCEDURE — G8420 CALC BMI NORM PARAMETERS: HCPCS | Performed by: INTERNAL MEDICINE

## 2024-01-17 PROCEDURE — G8427 DOCREV CUR MEDS BY ELIG CLIN: HCPCS | Performed by: INTERNAL MEDICINE

## 2024-01-17 PROCEDURE — 1036F TOBACCO NON-USER: CPT | Performed by: INTERNAL MEDICINE

## 2024-01-17 SDOH — ECONOMIC STABILITY: FOOD INSECURITY: WITHIN THE PAST 12 MONTHS, THE FOOD YOU BOUGHT JUST DIDN'T LAST AND YOU DIDN'T HAVE MONEY TO GET MORE.: NEVER TRUE

## 2024-01-17 SDOH — ECONOMIC STABILITY: INCOME INSECURITY: HOW HARD IS IT FOR YOU TO PAY FOR THE VERY BASICS LIKE FOOD, HOUSING, MEDICAL CARE, AND HEATING?: NOT VERY HARD

## 2024-01-17 SDOH — ECONOMIC STABILITY: FOOD INSECURITY: WITHIN THE PAST 12 MONTHS, YOU WORRIED THAT YOUR FOOD WOULD RUN OUT BEFORE YOU GOT MONEY TO BUY MORE.: NEVER TRUE

## 2024-01-17 ASSESSMENT — PATIENT HEALTH QUESTIONNAIRE - PHQ9
SUM OF ALL RESPONSES TO PHQ QUESTIONS 1-9: 0
2. FEELING DOWN, DEPRESSED OR HOPELESS: 0
SUM OF ALL RESPONSES TO PHQ QUESTIONS 1-9: 0
1. LITTLE INTEREST OR PLEASURE IN DOING THINGS: 0
SUM OF ALL RESPONSES TO PHQ9 QUESTIONS 1 & 2: 0

## 2024-01-17 ASSESSMENT — ANXIETY QUESTIONNAIRES
GAD7 TOTAL SCORE: 0
4. TROUBLE RELAXING: 0
2. NOT BEING ABLE TO STOP OR CONTROL WORRYING: 0
3. WORRYING TOO MUCH ABOUT DIFFERENT THINGS: 0
5. BEING SO RESTLESS THAT IT IS HARD TO SIT STILL: 0
6. BECOMING EASILY ANNOYED OR IRRITABLE: 0
IF YOU CHECKED OFF ANY PROBLEMS ON THIS QUESTIONNAIRE, HOW DIFFICULT HAVE THESE PROBLEMS MADE IT FOR YOU TO DO YOUR WORK, TAKE CARE OF THINGS AT HOME, OR GET ALONG WITH OTHER PEOPLE: NOT DIFFICULT AT ALL
1. FEELING NERVOUS, ANXIOUS, OR ON EDGE: 0
7. FEELING AFRAID AS IF SOMETHING AWFUL MIGHT HAPPEN: 0

## 2024-01-17 NOTE — PROGRESS NOTES
1. \"Have you been to the ER, urgent care clinic since your last visit?  Hospitalized since your last visit?\" No    2. \"Have you seen or consulted any other health care providers outside of the [facility] since your last visit?\" No    3. For patients aged 45-75: Has the patient had a colonoscopy / FIT/ Cologuard? Recommendation: Colonoscopy every 10y or annual FIT test from 50-75 or every 3 year stool DNA based test with consideration of ongoing screening from 76-85.      If the patient is female:    4. For patients aged 40-74: Has the patient had a mammogram within the past 2 years? No    5. For patients aged 21-65: Has the patient had a pap smear? No

## 2024-01-30 ASSESSMENT — ENCOUNTER SYMPTOMS
BACK PAIN: 1
EYES NEGATIVE: 1
ALLERGIC/IMMUNOLOGIC NEGATIVE: 1
SHORTNESS OF BREATH: 0
GASTROINTESTINAL NEGATIVE: 1
RESPIRATORY NEGATIVE: 1
ABDOMINAL PAIN: 0

## 2024-01-30 NOTE — PROGRESS NOTES
Subjective    Lela Caban is a 73 y.o. female who presents today for the following:  Chief Complaint   Patient presents with    Medication Refill    Hip Pain         Pt. comes in for f/u. Has a few chronic medical issues as documented.    History of cryptogenic stroke.  Has recovered completely.  No new neurological issues.    History of foramen PFO closure at [facility].  Followed by cardiologist.  Remains on on aspirin.    Remains on Zetia for HLD.  Denies any side effects.      Has some arthritic pains especially in hips.  Takes Tylenol as needed.  No falls or trauma.    Reports being very happy.  Her  passed away about 2 years ago.  Has a new close friend.      All chronic medical issues are stable on current treatment regimen.  Has had Covid-19 vaccination. Reports taking proper precautions. Denies any related signs or symptoms.    PMH/PSH/Allergies/Social History/medication list and most recent studies reviewed with patient.    Reports compliance with medications and diet. Trying to be active physically to control weight. Reports no other new c/o.     Social History     Tobacco Use   Smoking Status Never   Smokeless Tobacco Never     Social History     Substance and Sexual Activity   Alcohol Use Yes    Alcohol/week: 5.0 standard drinks of alcohol    Types: 5 Cans of beer per week         Past Medical History:   Diagnosis Date    Basal cell carcinoma of skin     Basal cell carcinoma of skin 2/22/2010    Cerebrovascular disease 3/6/23    Cervical spinal stenosis 2/22/2010    Chronic right shoulder pain 1/29/2021    Cryptogenic stroke (HCC) 3/22/2023    Diverticulosis of colon     Pituitary adenoma (HCC) 12/4/2017    Pure hypercholesterolemia 5/29/2012    Stenosis of lumbosacral spine     Unspecified disorder of the pituitary gland and its hypothalamic control        Allergies   Allergen Reactions    Statins Nausea And Vomiting     \"seemed to be cumulative, it became unbearable\"         Current Outpatient

## 2024-05-07 DIAGNOSIS — E78.00 PURE HYPERCHOLESTEROLEMIA: Primary | ICD-10-CM

## 2024-05-07 RX ORDER — EZETIMIBE 10 MG/1
10 TABLET ORAL DAILY
Qty: 90 TABLET | Refills: 0 | Status: SHIPPED | OUTPATIENT
Start: 2024-05-07

## 2024-05-07 NOTE — TELEPHONE ENCOUNTER
Last appt 1/17/2024      Next Apt:     Future Appointments   Date Time Provider Department Center   7/17/2024  1:00 PM Sabino Barrett, DO [facility]         [id number] - [place], [place] - [address] - P [phone] - F [phone] [address]  [place] [address]  Phone: [phone] Fax: [phone]

## 2024-05-13 DIAGNOSIS — E78.00 PURE HYPERCHOLESTEROLEMIA: ICD-10-CM

## 2024-05-13 NOTE — TELEPHONE ENCOUNTER
Last appt 1/17/2024      Next Apt:     Future Appointments   Date Time Provider Department Center   7/17/2024  1:00 PM Sabino Barrett, DO [facility]         [id number] - [place], [place] - [address] - P [phone] - F [phone] [address]  [place] [address]  Phone: [phone] Fax: [phone]

## 2024-05-14 RX ORDER — EZETIMIBE 10 MG/1
10 TABLET ORAL DAILY
Qty: 90 TABLET | Refills: 0 | Status: SHIPPED | OUTPATIENT
Start: 2024-05-14

## 2024-07-01 LAB
ERYTHROCYTE [DISTWIDTH] IN BLOOD BY AUTOMATED COUNT: 13.1 % (ref 11.7–15.4)
HCT VFR BLD AUTO: 41.1 % (ref 34–46.6)
HGB BLD-MCNC: 13.3 G/DL (ref 11.1–15.9)
MCH RBC QN AUTO: 29.8 PG (ref 26.6–33)
MCHC RBC AUTO-ENTMCNC: 32.4 G/DL (ref 31.5–35.7)
MCV RBC AUTO: 92 FL (ref 79–97)
PLATELET # BLD AUTO: 271 X10E3/UL (ref 150–450)
RBC # BLD AUTO: 4.47 X10E6/UL (ref 3.77–5.28)
WBC # BLD AUTO: 6.3 X10E3/UL (ref 3.4–10.8)

## 2024-07-02 LAB
ALBUMIN SERPL-MCNC: 4.1 G/DL (ref 3.8–4.8)
ALP SERPL-CCNC: 57 IU/L (ref 44–121)
ALT SERPL-CCNC: 13 IU/L (ref 0–32)
AST SERPL-CCNC: 20 IU/L (ref 0–40)
BILIRUB SERPL-MCNC: 1 MG/DL (ref 0–1.2)
BUN SERPL-MCNC: 11 MG/DL (ref 8–27)
BUN/CREAT SERPL: 14 (ref 12–28)
CALCIUM SERPL-MCNC: 8.8 MG/DL (ref 8.7–10.3)
CHLORIDE SERPL-SCNC: 106 MMOL/L (ref 96–106)
CHOLEST SERPL-MCNC: 230 MG/DL (ref 100–199)
CO2 SERPL-SCNC: 21 MMOL/L (ref 20–29)
CREAT SERPL-MCNC: 0.81 MG/DL (ref 0.57–1)
EGFRCR SERPLBLD CKD-EPI 2021: 76 ML/MIN/1.73
GLOBULIN SER CALC-MCNC: 2.1 G/DL (ref 1.5–4.5)
GLUCOSE SERPL-MCNC: 84 MG/DL (ref 70–99)
HDLC SERPL-MCNC: 75 MG/DL
IMP & REVIEW OF LAB RESULTS: NORMAL
LDLC SERPL CALC-MCNC: 142 MG/DL (ref 0–99)
POTASSIUM SERPL-SCNC: 4.4 MMOL/L (ref 3.5–5.2)
PROT SERPL-MCNC: 6.2 G/DL (ref 6–8.5)
SODIUM SERPL-SCNC: 142 MMOL/L (ref 134–144)
TRIGL SERPL-MCNC: 75 MG/DL (ref 0–149)
VLDLC SERPL CALC-MCNC: 13 MG/DL (ref 5–40)

## 2024-07-17 ENCOUNTER — OFFICE VISIT (OUTPATIENT)
Age: 74
End: 2024-07-17
Payer: MEDICARE

## 2024-07-17 VITALS
SYSTOLIC BLOOD PRESSURE: 114 MMHG | HEART RATE: 81 BPM | HEIGHT: 64 IN | BODY MASS INDEX: 21.51 KG/M2 | DIASTOLIC BLOOD PRESSURE: 78 MMHG | WEIGHT: 126 LBS | OXYGEN SATURATION: 97 %

## 2024-07-17 DIAGNOSIS — Z78.9 STATIN INTOLERANCE: ICD-10-CM

## 2024-07-17 DIAGNOSIS — E78.00 PURE HYPERCHOLESTEROLEMIA: Primary | ICD-10-CM

## 2024-07-17 DIAGNOSIS — H04.123 DRY EYES: ICD-10-CM

## 2024-07-17 DIAGNOSIS — Z87.74 S/P PATENT FORAMEN OVALE CLOSURE: ICD-10-CM

## 2024-07-17 DIAGNOSIS — I63.9 CRYPTOGENIC STROKE (HCC): ICD-10-CM

## 2024-07-17 PROCEDURE — 1090F PRES/ABSN URINE INCON ASSESS: CPT | Performed by: INTERNAL MEDICINE

## 2024-07-17 PROCEDURE — 1123F ACP DISCUSS/DSCN MKR DOCD: CPT | Performed by: INTERNAL MEDICINE

## 2024-07-17 PROCEDURE — 1036F TOBACCO NON-USER: CPT | Performed by: INTERNAL MEDICINE

## 2024-07-17 PROCEDURE — G8427 DOCREV CUR MEDS BY ELIG CLIN: HCPCS | Performed by: INTERNAL MEDICINE

## 2024-07-17 PROCEDURE — G8420 CALC BMI NORM PARAMETERS: HCPCS | Performed by: INTERNAL MEDICINE

## 2024-07-17 PROCEDURE — 3017F COLORECTAL CA SCREEN DOC REV: CPT | Performed by: INTERNAL MEDICINE

## 2024-07-17 PROCEDURE — G8399 PT W/DXA RESULTS DOCUMENT: HCPCS | Performed by: INTERNAL MEDICINE

## 2024-07-17 PROCEDURE — 99214 OFFICE O/P EST MOD 30 MIN: CPT | Performed by: INTERNAL MEDICINE

## 2024-07-17 RX ORDER — EZETIMIBE 10 MG/1
10 TABLET ORAL DAILY
Qty: 90 TABLET | Refills: 1 | Status: SHIPPED | OUTPATIENT
Start: 2024-07-17

## 2024-07-17 NOTE — PROGRESS NOTES
Chief Complaint   Patient presents with    Follow-up     Pt states she had an episode 2 days ago where she had chest pain on her left side. Took Ibuprofen since she was not having an other symptoms of a MI. Woke up next morning okay.    \"Have you been to the ER, urgent care clinic since your last visit?  Hospitalized since your last visit?\"    NO    “Have you seen or consulted any other health care providers outside of [facility] since your last visit?”    NO        “Have you had a colorectal cancer screening such as a colonoscopy/FIT/Cologuard?    YES - Type: Colonoscopy - Where: [facility]/CMA to request most recent records if not in the chart     Date of last Colonoscopy: 4/4/2014  No cologuard on file  No FIT/FOBT on file   No flexible sigmoidoscopy on file         Click Here for Release of Records Request

## 2024-07-29 ASSESSMENT — ENCOUNTER SYMPTOMS
RESPIRATORY NEGATIVE: 1
ALLERGIC/IMMUNOLOGIC NEGATIVE: 1
ABDOMINAL PAIN: 0
SHORTNESS OF BREATH: 0
GASTROINTESTINAL NEGATIVE: 1
EYES NEGATIVE: 1
BACK PAIN: 1

## 2024-07-30 NOTE — PROGRESS NOTES
Subjective    Lela Caban is a 74 y.o. female who presents today for the following:  Chief Complaint   Patient presents with    Follow-up       History of Present Illness  The patient presents for evaluation of left-sided chest pain.    She experienced an episode of mild left-sided chest pain upon waking that lasted until bedtime, which caused her concern. She did not exhibit any other symptoms of a heart attack such as shortness of breath, nausea, vomiting, sweating, or fatigue. She monitored the pain throughout the day and used ibuprofen, which alleviated the pain. She does not experience chest pain with exertion. She has no history of heart issues except for closure of patent foramen ovale (PFO).  History of previously stroke without any residuals.  Her current medications include Zetia and baby aspirin, and she takes supplements such as calcium, vitamins, magnesium, and turmeric. Recent blood work indicated elevated cholesterol levels.  LDL is 142.  HDL 75.  She cannot tolerate statins.  She denies any new neurological issues.    Supplemental Information  She is working on her eyes mostly for dryness. Her eye doctor had her come in every 2 weeks for a while. She uses eye drops, cleansers, spray, mask, and sleeps with goggles. She had cataract surgery in both eyes. She is trying to hydrate. She drives every other morning.   She does not smoke. She drinks alcohol socially.    PMH/PSH/Allergies/Social History/medication list and most recent studies reviewed with patient.    Reports compliance with medications and diet. Trying to be active physically to control weight. Reports no other new c/o.     Social History     Tobacco Use   Smoking Status Never   Smokeless Tobacco Never     Social History     Substance and Sexual Activity   Alcohol Use Yes    Alcohol/week: 5.0 standard drinks of alcohol    Types: 5 Cans of beer per week         Past Medical History:   Diagnosis Date    Basal cell carcinoma of skin     Basal

## 2025-01-30 ENCOUNTER — OFFICE VISIT (OUTPATIENT)
Age: 75
End: 2025-01-30
Payer: MEDICARE

## 2025-01-30 VITALS
RESPIRATION RATE: 16 BRPM | HEART RATE: 100 BPM | DIASTOLIC BLOOD PRESSURE: 66 MMHG | HEIGHT: 64 IN | WEIGHT: 128 LBS | OXYGEN SATURATION: 98 % | BODY MASS INDEX: 21.85 KG/M2 | TEMPERATURE: 98.2 F | SYSTOLIC BLOOD PRESSURE: 116 MMHG

## 2025-01-30 DIAGNOSIS — Z12.31 ENCOUNTER FOR SCREENING MAMMOGRAM FOR MALIGNANT NEOPLASM OF BREAST: ICD-10-CM

## 2025-01-30 DIAGNOSIS — Z71.89 ACP (ADVANCE CARE PLANNING): ICD-10-CM

## 2025-01-30 DIAGNOSIS — Z12.11 SCREEN FOR COLON CANCER: ICD-10-CM

## 2025-01-30 DIAGNOSIS — I63.9 CRYPTOGENIC STROKE (HCC): Primary | ICD-10-CM

## 2025-01-30 DIAGNOSIS — Z78.0 POST-MENOPAUSAL: ICD-10-CM

## 2025-01-30 DIAGNOSIS — E78.00 PURE HYPERCHOLESTEROLEMIA: ICD-10-CM

## 2025-01-30 DIAGNOSIS — R09.82 POST-NASAL DRIP: ICD-10-CM

## 2025-01-30 DIAGNOSIS — Z00.00 MEDICARE ANNUAL WELLNESS VISIT, SUBSEQUENT: ICD-10-CM

## 2025-01-30 PROCEDURE — 99497 ADVNCD CARE PLAN 30 MIN: CPT | Performed by: INTERNAL MEDICINE

## 2025-01-30 PROCEDURE — 99214 OFFICE O/P EST MOD 30 MIN: CPT | Performed by: INTERNAL MEDICINE

## 2025-01-30 RX ORDER — EZETIMIBE 10 MG/1
10 TABLET ORAL DAILY
Qty: 90 TABLET | Refills: 1 | Status: SHIPPED | OUTPATIENT
Start: 2025-01-30

## 2025-01-30 RX ORDER — FLUTICASONE PROPIONATE 50 MCG
1 SPRAY, SUSPENSION (ML) NASAL DAILY
Qty: 32 G | Refills: 1 | Status: SHIPPED | OUTPATIENT
Start: 2025-01-30

## 2025-01-30 SDOH — ECONOMIC STABILITY: FOOD INSECURITY: WITHIN THE PAST 12 MONTHS, YOU WORRIED THAT YOUR FOOD WOULD RUN OUT BEFORE YOU GOT MONEY TO BUY MORE.: NEVER TRUE

## 2025-01-30 SDOH — ECONOMIC STABILITY: FOOD INSECURITY: WITHIN THE PAST 12 MONTHS, THE FOOD YOU BOUGHT JUST DIDN'T LAST AND YOU DIDN'T HAVE MONEY TO GET MORE.: NEVER TRUE

## 2025-01-30 ASSESSMENT — PATIENT HEALTH QUESTIONNAIRE - PHQ9
SUM OF ALL RESPONSES TO PHQ QUESTIONS 1-9: 0
SUM OF ALL RESPONSES TO PHQ QUESTIONS 1-9: 0
2. FEELING DOWN, DEPRESSED OR HOPELESS: NOT AT ALL
SUM OF ALL RESPONSES TO PHQ QUESTIONS 1-9: 0
SUM OF ALL RESPONSES TO PHQ QUESTIONS 1-9: 0
1. LITTLE INTEREST OR PLEASURE IN DOING THINGS: NOT AT ALL
SUM OF ALL RESPONSES TO PHQ9 QUESTIONS 1 & 2: 0

## 2025-01-30 ASSESSMENT — LIFESTYLE VARIABLES
HOW OFTEN DURING THE LAST YEAR HAVE YOU FAILED TO DO WHAT WAS NORMALLY EXPECTED FROM YOU BECAUSE OF DRINKING: NEVER
HOW OFTEN DURING THE LAST YEAR HAVE YOU HAD A FEELING OF GUILT OR REMORSE AFTER DRINKING: NEVER
HAVE YOU OR SOMEONE ELSE BEEN INJURED AS A RESULT OF YOUR DRINKING: NO
HOW OFTEN DURING THE LAST YEAR HAVE YOU FOUND THAT YOU WERE NOT ABLE TO STOP DRINKING ONCE YOU HAD STARTED: NEVER
HOW OFTEN DURING THE LAST YEAR HAVE YOU BEEN UNABLE TO REMEMBER WHAT HAPPENED THE NIGHT BEFORE BECAUSE YOU HAD BEEN DRINKING: NEVER
HOW MANY STANDARD DRINKS CONTAINING ALCOHOL DO YOU HAVE ON A TYPICAL DAY: 1 OR 2
HOW OFTEN DO YOU HAVE A DRINK CONTAINING ALCOHOL: 4 OR MORE TIMES A WEEK
HAS A RELATIVE, FRIEND, DOCTOR, OR ANOTHER HEALTH PROFESSIONAL EXPRESSED CONCERN ABOUT YOUR DRINKING OR SUGGESTED YOU CUT DOWN: NO
HOW OFTEN DURING THE LAST YEAR HAVE YOU NEEDED AN ALCOHOLIC DRINK FIRST THING IN THE MORNING TO GET YOURSELF GOING AFTER A NIGHT OF HEAVY DRINKING: NEVER

## 2025-01-30 ASSESSMENT — ENCOUNTER SYMPTOMS
EYES NEGATIVE: 1
RESPIRATORY NEGATIVE: 1
GASTROINTESTINAL NEGATIVE: 1

## 2025-01-30 NOTE — PROGRESS NOTES
Medicare Annual Wellness Visit    Lela Caban is here for Medicare AWV, Cryptogenic stroke, Back Pain, Cholesterol Problem, and History of pituitary adenoma    Assessment & Plan   Cryptogenic stroke (HCC)  -     Lipid Panel  -     Comprehensive Metabolic Panel  Medicare annual wellness visit, subsequent  Pure hypercholesterolemia  -     ezetimibe (ZETIA) 10 MG tablet; Take 1 tablet by mouth daily, Disp-90 tablet, R-1Normal  -     Lipid Panel  -     Comprehensive Metabolic Panel  ACP (advance care planning)  Screen for colon cancer  -     [facility] - Mehrdad Castillo MD, [facility])  Encounter for screening mammogram for malignant neoplasm of breast  -     [facility] DIGITAL SCREEN W OR WO CAD BILATERAL; Future  Post-menopausal  -     DEXA BONE DENSITY AXIAL SKELETON; Future  Post-nasal drip  -     fluticasone (FLONASE) 50 MCG/ACT nasal spray; 1 spray by Each Nostril route daily, Disp-32 g, R-1Normal       No follow-ups on file.     Subjective   Patient is here for Medicare wellness visit.  Has no living will.  Memory seems great.  No gait or balance problem.    Patient's complete Health Risk Assessment and screening values have been reviewed and are found in Flowsheets. The following problems were reviewed today and where indicated follow up appointments were made and/or referrals ordered.    Positive Risk Factor Screenings with Interventions:                      Advanced Directives:  Do you have a Living Will?: (!) No    Intervention:  see ACP note                     Objective   Vitals:    01/30/25 1505   BP: 116/66   Site: Left Upper Arm   Position: Sitting   Cuff Size: Medium Adult   Pulse: 100   Resp: 16   Temp: 98.2 °F (36.8 °C)   TempSrc: Oral   SpO2: 98%   Weight: 58.1 kg (128 lb)   Height: 1.626 m (5' 4\")      Body mass index is 21.97 kg/m².                    Allergies   Allergen Reactions    Statins Nausea And Vomiting     \"seemed to be cumulative, it became unbearable\"      Prior to

## 2025-01-30 NOTE — PROGRESS NOTES
Chief Complaint   Patient presents with    Medicare AW    Cryptogenic stroke    Back Pain    Cholesterol Problem    History of pituitary adenoma           History of Present Illness  The patient presents for evaluation of nasal congestion, urinary incontinence, and hypercholesterolemia.    Nasal Congestion  She reports experiencing mild nasal congestion, which she perceives as a chronic issue that often extends into her left ear. The onset of this symptom was noted in 10/2024, with a significant exacerbation during the night. She expresses a preference for non-antibiotic treatments and has been managing the condition with a neti pot, saline, and vitamins. Despite these interventions, she reports a prolonged recovery period. Currently, she is not experiencing any discomfort but reports a persistent postnasal drip.  - Onset: Noted in 10/2024.  - Location: Nasal area, extending into the left ear.  - Duration: Chronic issue with prolonged recovery period.  - Character: Mild nasal congestion with persistent postnasal drip.  - Alleviating/Aggravating Factors: Managed with neti pot, saline, and vitamins; prefers non-antibiotic treatments.  - Timing: Significant exacerbation during the night.    Urinary Incontinence  She also reports the onset of urinary incontinence, which she describes as manageable with the use of pads during the day. She expresses a desire to initiate Kegel exercises as a potential treatment option.  - Onset: Not specified.  - Character: Manageable with the use of pads during the day.  - Alleviating/Aggravating Factors: Desires to initiate Kegel exercises.    Hypercholesterolemia  She has a history of statin intolerance, which resulted in an emergency room visit due to progressive nausea and concerns about jaundice. She is currently on Zetia and requires a refill. She also takes aspirin, which she obtains over the counter.  - Onset: Not specified.  - Character: History of statin intolerance with

## 2025-01-30 NOTE — ACP (ADVANCE CARE PLANNING)

## 2025-01-30 NOTE — PROGRESS NOTES
Chief Complaint   Patient presents with    Medicare AWV    Cryptogenic stroke    Back Pain    Cholesterol Problem    History of pituitary adenoma     \"Have you been to the ER, urgent care clinic since your last visit?  Hospitalized since your last visit?\"    NO    “Have you seen or consulted any other health care providers outside our system since your last visit?”    NO      “Have you had a colorectal cancer screening such as a colonoscopy/FIT/Cologuard?    NO    Date of last Colonoscopy: 4/4/2014  No cologuard on file  No FIT/FOBT on file   No flexible sigmoidoscopy on file

## 2025-02-05 LAB
ALBUMIN SERPL-MCNC: 4.2 G/DL (ref 3.8–4.8)
ALP SERPL-CCNC: 57 IU/L (ref 44–121)
ALT SERPL-CCNC: 14 IU/L (ref 0–32)
AST SERPL-CCNC: 16 IU/L (ref 0–40)
BILIRUB SERPL-MCNC: 0.7 MG/DL (ref 0–1.2)
BUN SERPL-MCNC: 11 MG/DL (ref 8–27)
BUN/CREAT SERPL: 14 (ref 12–28)
CALCIUM SERPL-MCNC: 9.1 MG/DL (ref 8.7–10.3)
CHLORIDE SERPL-SCNC: 105 MMOL/L (ref 96–106)
CHOLEST SERPL-MCNC: 250 MG/DL (ref 100–199)
CO2 SERPL-SCNC: 22 MMOL/L (ref 20–29)
CREAT SERPL-MCNC: 0.81 MG/DL (ref 0.57–1)
EGFRCR SERPLBLD CKD-EPI 2021: 76 ML/MIN/1.73
GLOBULIN SER CALC-MCNC: 2.1 G/DL (ref 1.5–4.5)
GLUCOSE SERPL-MCNC: 88 MG/DL (ref 70–99)
HDLC SERPL-MCNC: 78 MG/DL
IMP & REVIEW OF LAB RESULTS: NORMAL
LDLC SERPL CALC-MCNC: 155 MG/DL (ref 0–99)
POTASSIUM SERPL-SCNC: 4.2 MMOL/L (ref 3.5–5.2)
PROT SERPL-MCNC: 6.3 G/DL (ref 6–8.5)
SODIUM SERPL-SCNC: 141 MMOL/L (ref 134–144)
TRIGL SERPL-MCNC: 99 MG/DL (ref 0–149)
VLDLC SERPL CALC-MCNC: 17 MG/DL (ref 5–40)

## 2025-05-07 ENCOUNTER — OFFICE VISIT (OUTPATIENT)
Age: 75
End: 2025-05-07
Payer: MEDICARE

## 2025-05-07 VITALS
HEART RATE: 98 BPM | TEMPERATURE: 97.9 F | BODY MASS INDEX: 21.34 KG/M2 | HEIGHT: 64 IN | DIASTOLIC BLOOD PRESSURE: 66 MMHG | WEIGHT: 125 LBS | SYSTOLIC BLOOD PRESSURE: 122 MMHG | OXYGEN SATURATION: 98 % | RESPIRATION RATE: 16 BRPM

## 2025-05-07 DIAGNOSIS — H65.92 FLUID LEVEL BEHIND TYMPANIC MEMBRANE OF LEFT EAR: ICD-10-CM

## 2025-05-07 DIAGNOSIS — I63.9 CRYPTOGENIC STROKE (HCC): ICD-10-CM

## 2025-05-07 DIAGNOSIS — E78.00 PURE HYPERCHOLESTEROLEMIA: Primary | ICD-10-CM

## 2025-05-07 DIAGNOSIS — E78.00 PURE HYPERCHOLESTEROLEMIA: ICD-10-CM

## 2025-05-07 DIAGNOSIS — Z78.9 STATIN INTOLERANCE: ICD-10-CM

## 2025-05-07 PROCEDURE — 1126F AMNT PAIN NOTED NONE PRSNT: CPT | Performed by: INTERNAL MEDICINE

## 2025-05-07 PROCEDURE — 3017F COLORECTAL CA SCREEN DOC REV: CPT | Performed by: INTERNAL MEDICINE

## 2025-05-07 PROCEDURE — 99214 OFFICE O/P EST MOD 30 MIN: CPT | Performed by: INTERNAL MEDICINE

## 2025-05-07 PROCEDURE — 1036F TOBACCO NON-USER: CPT | Performed by: INTERNAL MEDICINE

## 2025-05-07 PROCEDURE — G8427 DOCREV CUR MEDS BY ELIG CLIN: HCPCS | Performed by: INTERNAL MEDICINE

## 2025-05-07 PROCEDURE — 1090F PRES/ABSN URINE INCON ASSESS: CPT | Performed by: INTERNAL MEDICINE

## 2025-05-07 PROCEDURE — 1160F RVW MEDS BY RX/DR IN RCRD: CPT | Performed by: INTERNAL MEDICINE

## 2025-05-07 PROCEDURE — G8420 CALC BMI NORM PARAMETERS: HCPCS | Performed by: INTERNAL MEDICINE

## 2025-05-07 PROCEDURE — 1123F ACP DISCUSS/DSCN MKR DOCD: CPT | Performed by: INTERNAL MEDICINE

## 2025-05-07 PROCEDURE — G8399 PT W/DXA RESULTS DOCUMENT: HCPCS | Performed by: INTERNAL MEDICINE

## 2025-05-07 PROCEDURE — 1159F MED LIST DOCD IN RCRD: CPT | Performed by: INTERNAL MEDICINE

## 2025-05-07 ASSESSMENT — ENCOUNTER SYMPTOMS
RESPIRATORY NEGATIVE: 1
EYES NEGATIVE: 1
GASTROINTESTINAL NEGATIVE: 1

## 2025-05-07 NOTE — PROGRESS NOTES
Chief Complaint   Patient presents with    Cryptogenic stroke (HCC)    PFO (patent foramen ovale)    Cholesterol Problem    Osteopenia    Follow-up Chronic Condition     Have you been to the ER, urgent care clinic since your last visit?  Hospitalized since your last visit?   NO    Have you seen or consulted any other health care providers outside our system since your last visit?   NO      “Have you had a colorectal cancer screening such as a colonoscopy/FIT/Cologuard?    NO    Date of last Colonoscopy: 4/4/2014  No cologuard on file  No FIT/FOBT on file   No flexible sigmoidoscopy on file          
advised that Artificial Intelligence will be utilized during this visit to record and process the conversation to generate a clinical note. The patient (or guardian, if applicable) and other individuals in attendance at the appointment consented to the use of AI, including the recording.

## 2025-06-27 ENCOUNTER — HOSPITAL ENCOUNTER (OUTPATIENT)
Facility: HOSPITAL | Age: 75
Setting detail: OUTPATIENT SURGERY
Discharge: HOME | End: 2025-06-27
Attending: COLON & RECTAL SURGERY | Admitting: COLON & RECTAL SURGERY
Payer: MEDICARE

## 2025-06-27 ENCOUNTER — ANESTHESIA (OUTPATIENT)
Facility: HOSPITAL | Age: 75
End: 2025-06-27
Payer: MEDICARE

## 2025-06-27 ENCOUNTER — ANESTHESIA EVENT (OUTPATIENT)
Facility: HOSPITAL | Age: 75
End: 2025-06-27
Payer: MEDICARE

## 2025-06-27 VITALS
WEIGHT: 126 LBS | HEART RATE: 64 BPM | SYSTOLIC BLOOD PRESSURE: 123 MMHG | HEIGHT: 64 IN | DIASTOLIC BLOOD PRESSURE: 65 MMHG | OXYGEN SATURATION: 100 % | TEMPERATURE: 97.4 F | RESPIRATION RATE: 18 BRPM | BODY MASS INDEX: 21.51 KG/M2

## 2025-06-27 PROCEDURE — 7100000010 HC PHASE II RECOVERY - FIRST 15 MIN: Performed by: COLON & RECTAL SURGERY

## 2025-06-27 PROCEDURE — 3700000000 HC ANESTHESIA ATTENDED CARE: Performed by: COLON & RECTAL SURGERY

## 2025-06-27 PROCEDURE — 2709999900 HC NON-CHARGEABLE SUPPLY: Performed by: COLON & RECTAL SURGERY

## 2025-06-27 PROCEDURE — 2580000003 HC RX 258: Performed by: COLON & RECTAL SURGERY

## 2025-06-27 PROCEDURE — 3600007502: Performed by: COLON & RECTAL SURGERY

## 2025-06-27 PROCEDURE — 3600007512: Performed by: COLON & RECTAL SURGERY

## 2025-06-27 PROCEDURE — 3700000001 HC ADD 15 MINUTES (ANESTHESIA): Performed by: COLON & RECTAL SURGERY

## 2025-06-27 PROCEDURE — 6360000002 HC RX W HCPCS: Performed by: NURSE ANESTHETIST, CERTIFIED REGISTERED

## 2025-06-27 PROCEDURE — 7100000011 HC PHASE II RECOVERY - ADDTL 15 MIN: Performed by: COLON & RECTAL SURGERY

## 2025-06-27 RX ORDER — SODIUM CHLORIDE 0.9 % (FLUSH) 0.9 %
5-40 SYRINGE (ML) INJECTION PRN
Status: DISCONTINUED | OUTPATIENT
Start: 2025-06-27 | End: 2025-06-27 | Stop reason: HOSPADM

## 2025-06-27 RX ORDER — LIDOCAINE HYDROCHLORIDE 20 MG/ML
INJECTION, SOLUTION EPIDURAL; INFILTRATION; INTRACAUDAL; PERINEURAL
Status: DISCONTINUED | OUTPATIENT
Start: 2025-06-27 | End: 2025-06-27 | Stop reason: SDUPTHER

## 2025-06-27 RX ORDER — SODIUM CHLORIDE 9 MG/ML
INJECTION, SOLUTION INTRAVENOUS CONTINUOUS
Status: DISCONTINUED | OUTPATIENT
Start: 2025-06-27 | End: 2025-06-27 | Stop reason: HOSPADM

## 2025-06-27 RX ORDER — SODIUM CHLORIDE 9 MG/ML
INJECTION, SOLUTION INTRAVENOUS PRN
Status: DISCONTINUED | OUTPATIENT
Start: 2025-06-27 | End: 2025-06-27 | Stop reason: HOSPADM

## 2025-06-27 RX ORDER — SODIUM CHLORIDE 0.9 % (FLUSH) 0.9 %
5-40 SYRINGE (ML) INJECTION EVERY 12 HOURS SCHEDULED
Status: DISCONTINUED | OUTPATIENT
Start: 2025-06-27 | End: 2025-06-27 | Stop reason: HOSPADM

## 2025-06-27 RX ADMIN — PROPOFOL 20 MG: 10 INJECTION, EMULSION INTRAVENOUS at 12:31

## 2025-06-27 RX ADMIN — PROPOFOL 20 MG: 10 INJECTION, EMULSION INTRAVENOUS at 12:43

## 2025-06-27 RX ADMIN — PROPOFOL 100 MG: 10 INJECTION, EMULSION INTRAVENOUS at 12:16

## 2025-06-27 RX ADMIN — PROPOFOL 20 MG: 10 INJECTION, EMULSION INTRAVENOUS at 12:48

## 2025-06-27 RX ADMIN — PROPOFOL 20 MG: 10 INJECTION, EMULSION INTRAVENOUS at 12:36

## 2025-06-27 RX ADMIN — LIDOCAINE HYDROCHLORIDE 40 MG: 20 INJECTION, SOLUTION EPIDURAL; INFILTRATION; INTRACAUDAL; PERINEURAL at 12:16

## 2025-06-27 RX ADMIN — PROPOFOL 50 MG: 10 INJECTION, EMULSION INTRAVENOUS at 12:22

## 2025-06-27 RX ADMIN — SODIUM CHLORIDE: 9 INJECTION, SOLUTION INTRAVENOUS at 11:56

## 2025-06-27 RX ADMIN — PROPOFOL 20 MG: 10 INJECTION, EMULSION INTRAVENOUS at 12:25

## 2025-06-27 NOTE — DISCHARGE INSTRUCTIONS
[facility].  [address]  [place] [address] [phone]      Post-Colonoscopy Instructions    Do not drive, operate dangerous machinery, sign legal documents, or conduct any important business for the rest of the day.    You may begin with a light diet today then advance to your usual diet as tolerated.  Do not drink alcohol for the rest of the day.    If you notice blood in your stool for more than one or two bowel movements following the procedure, if you develop abdominal pain (aside from gas cramps on the day of the procedure), or if you develop a fever with a temperature of 101.0 or higher, call my office.    If you do not have a bowel movement within the next two days, call my office.    If you have any other problems or questions, please call my office.    6.   Findings and Follow-up:  There was no cancer or polyp.  The diverticulosis (not diverticulitis) was of course see, and you should consume a high fiber diet.  Based upon current guidelines, you do not need to plan on having another screening colonoscopy; however, you should keep in mind that screening is (by definition) performed on people with no symptoms.  If you have signs or symptoms of a problem you should discuss them with your primary physician.

## 2025-06-27 NOTE — H&P
History and Physical (outpatient)    Patient: Lela Caban 75 y.o. female     Chief Complaint: Need for colorectal cancer screening    History of Present Illness:  The patient id an asymptomatic 75-year-old female whose last colonoscopy was performed on 2014.  The procedure revealed extensive sigmoid diverticulosis and no neoplasms. Her family history I significant for her maternal grandmother radha  of rectal cancer.      History:  Past Medical History:   Diagnosis Date    Basal cell carcinoma of skin     Basal cell carcinoma of skin 2010    Cerebrovascular disease 3/6/23    Cervical spinal stenosis 2010    Chronic right shoulder pain 2021    Cryptogenic stroke (HCC) 3/22/2023    Diverticulosis of colon     Pituitary adenoma (HCC) 2017    Pure hypercholesterolemia 2012    Stenosis of lumbosacral spine     Unspecified disorder of the pituitary gland and its hypothalamic control        Past Surgical History:   Procedure Laterality Date    CARDIAC SURGERY  2023    PFO closure    COLONOSCOPY  2014    Diverticulosis. No neoplasms. Dr. Castillo.    COLONOSCOPY  10/13/2008    Diverticulosis.  No neoplasms. Dr. Castillo.    GYN      D&C's    HEENT      neck surgery as infant    OTHER SURGICAL HISTORY      Excision of basal cell carcinoma.    UNS ORAL SURG PROC BY REPORT         Family History   Problem Relation Age of Onset    Hypertension Mother     Heart Disease Mother     Glaucoma Mother     Gout Mother     Hearing Loss Mother     Coronary Art Dis Father     Emphysema Father     Cancer Maternal Grandmother          of rectal cancer.    Stroke Brother      Social History     Socioeconomic History    Marital status:      Spouse name: Not on file    Number of children: Not on file    Years of education: Not on file    Highest education level: Not on file   Occupational History    Not on file   Tobacco Use    Smoking status: Never    Smokeless tobacco: Never    Substance and Sexual Activity    Alcohol use: Yes     Alcohol/week: 10.0 standard drinks of alcohol     Types: 5 Glasses of wine, 5 Cans of beer per week    Drug use: No    Sexual activity: Not Currently     Partners: Male     Comment: . No longer sexually active.   Other Topics Concern    Not on file   Social History Narrative    Not on file     Social Drivers of Health     Financial Resource Strain: Low Risk  (1/17/2024)    Overall Financial Resource Strain (CARDIA)     Difficulty of Paying Living Expenses: Not very hard   Food Insecurity: No Food Insecurity (1/30/2025)    Hunger Vital Sign     Worried About Running Out of Food in the Last Year: Never true     Ran Out of Food in the Last Year: Never true   Transportation Needs: No Transportation Needs (1/30/2025)    PRAPARE - Transportation     Lack of Transportation (Medical): No     Lack of Transportation (Non-Medical): No   Physical Activity: Sufficiently Active (1/30/2025)    Exercise Vital Sign     Days of Exercise per Week: 3 days     Minutes of Exercise per Session: 60 min   Stress: Not on file   Social Connections: Not on file   Intimate Partner Violence: Not on file   Housing Stability: Low Risk  (1/30/2025)    Housing Stability Vital Sign     Unable to Pay for Housing in the Last Year: No     Number of Times Moved in the Last Year: 0     Homeless in the Last Year: No       Allergies:   Allergies   Allergen Reactions    Statins Nausea And Vomiting     \"seemed to be cumulative, it became unbearable\"        Current Medications:  Cannot display prior to admission medications because the patient has not been admitted in this contact.       No current facility-administered medications for this encounter.     Current Outpatient Medications   Medication Sig    ezetimibe (ZETIA) 10 MG tablet Take 1 tablet by mouth daily    fluticasone (FLONASE) 50 MCG/ACT nasal spray 1 spray by Each Nostril route daily    Turmeric Curcumin 500 MG CAPS     Multiple

## 2025-06-27 NOTE — ANESTHESIA POSTPROCEDURE EVALUATION
Department of Anesthesiology  Postprocedure Note    Patient: Lela Caban  MRN: [id number]  YOB: 1950  Date of evaluation: 6/27/2025    Procedure Summary       Date: 06/27/25 Room / Location: [facility] ENDO 03 / [facility] ENDOSCOPY    Anesthesia Start: 1200 Anesthesia Stop: 1256    Procedure: COLONOSCOPY Diagnosis:       Colon cancer screening      (Colon cancer screening [Z12.11])    Surgeons: Mehrdad Castillo MD Responsible Provider: Sixto Walton MD    Anesthesia Type: MAC ASA Status: 2            Anesthesia Type: MAC    Mamie Phase I: Mamie Score: 10    Mamie Phase II: Mamie Score: 10    Anesthesia Post Evaluation    Patient location during evaluation: bedside  Nausea & Vomiting: no nausea  Cardiovascular status: blood pressure returned to baseline  Respiratory status: acceptable  Hydration status: euvolemic    No notable events documented.

## 2025-06-27 NOTE — PROGRESS NOTES
Verified patient name and date of birth, scheduled procedure, and informed consent. Reviewed general discharge instructions and  information.  Assessed patient. Awake, alert, and oriented per baseline. Vital signs stable (see vital sign flowsheet). Respiratory status within defined limits, abdomen soft and non tender. Skin with in defined limits.     Initial RN admission and assessment performed and documented in Endoscopy navigator.     Patient evaluated by anesthesia in pre-procedure holding.     All procedural vital signs, airway assessment, and level of consciousness information monitored and recorded by anesthesia staff on the anesthesia record.     Report received from CRNA post procedure.  Patient transported to recovery area by RN.    Endoscopy post procedure time out was performed and specimens were verified with physician.    Endoscope was pre-cleaned at bedside immediately following procedure by Bob.

## 2025-06-27 NOTE — ANESTHESIA PRE PROCEDURE
Department of Anesthesiology  Preprocedure Note       Name:  Lela Caban   Age:  75 y.o.  :  1950                                          MRN:  [id number]         Date:  2025      Surgeon: Surgeon(s):  Mehrdad Castillo MD    Procedure: Procedure(s):  COLONOSCOPY    Medications prior to admission:   Prior to Admission medications    Medication Sig Start Date End Date Taking? Authorizing Provider   Multiple Vitamins-Minerals (EYE HEALTH AREDS 2 PO) Take by mouth daily   Yes Matt Fleming MD   ezetimibe (ZETIA) 10 MG tablet Take 1 tablet by mouth daily 25  Yes Miriam Araujo MD   fluticasone (FLONASE) 50 MCG/ACT nasal spray 1 spray by Each Nostril route daily 25  Yes Miriam Araujo MD   Turmeric Curcumin 500 MG CAPS  23  Yes Matt Fleming MD   Multiple Vitamins-Minerals (WOMENS MULTI GUMMIES) CHEW  22  Yes Matt Fleming MD   MAGNESIUM GLYCINATE PO  5/15/23  Yes Matt Fleming MD   ASPIRIN 81 PO  3/11/23  Yes Matt Fleming MD   Flaxseed Oil OIL by Other route   Yes Automatic Reconciliation, Ar   Calcium Citrate-Vitamin D3 315-6.25 MG-MCG TABS Take by mouth every 30 days 2/22/10  Yes Automatic Reconciliation, Ar       Current medications:    Current Facility-Administered Medications   Medication Dose Route Frequency Provider Last Rate Last Admin   • 0.9 % sodium chloride infusion   IntraVENous Continuous Mehrdad Castillo MD 50 mL/hr at 25 1200 NoRateChange at 25 1200   • sodium chloride flush 0.9 % injection 5-40 mL  5-40 mL IntraVENous 2 times per day Mehrdad Castillo MD       • sodium chloride flush 0.9 % injection 5-40 mL  5-40 mL IntraVENous PRN Mehrdad Castillo MD       • 0.9 % sodium chloride infusion   IntraVENous PRN Mehrdad Castillo MD         Current Outpatient Medications   Medication Sig Dispense Refill   • Multiple Vitamins-Minerals (EYE HEALTH AREDS 2 PO) Take by mouth daily     • ezetimibe (ZETIA) 10 MG

## 2025-06-27 NOTE — OP NOTE
Lela Caban  [id number]  1950    Colonoscopy Operative Report    Procedure Type:   Colonoscopy     Pre-operative Diagnosis:  Need for colorectal cancer screening     Post-operative Diagnosis:  Diverticulosis    Surgeon:  Mehrdad Castillo MD    Assistant:  Brisa Mcucrdy RN    Referring Provider: Miriam Araujo MD    Sedation and Analgesia:  MAC anesthesia Propofol (250 mg)    Specimens Removed:  None    EBL:  0 mL    Complications: None apparent    Implants:  None    Indication For Procedure:  The patient id an asymptomatic 75-year-old female whose last colonoscopy was performed on 2014.  The procedure revealed extensive sigmoid diverticulosis and no neoplasms. Her family history I significant for her maternal grandmother radha  of rectal cancer.  Another colonoscopy is now indicated for colorectal cancer screening.     Findings:  There was extensive sigmoid diverticulosis.  There were no neoplasms.    Procedure Details:  After informed consent was obtained, the patient was taken to the endoscopy suite where standard monitoring devices were attached and intravenous access was established.  Sedation was administered by the anesthetist as needed throughout the procedure.  The patient was placed in the left lateral decubitus position, and inspection of the perineum revealed no significant external lesions.  Digital rectal examination revealed no masses.    The Olympus videocolonoscope was lubricated and inserted transanally into the rectum. It was advanced into the colon and without difficulty to the cecum, which was identified by the presence of the ileocecal valve and the appendiceal orifice.  The quality of the bowel preparation was good, as was the overall visualization.  Careful inspection was performed during withdrawal of the colonoscope.  There were no apparent complications, and the patient appeared to have tolerated the procedure well.  At its conclusion, she was transported to the recovery

## (undated) DEVICE — SUPPLEMENT DIGESTIVE H2O SOL GI-EASE